# Patient Record
Sex: FEMALE | Race: WHITE | NOT HISPANIC OR LATINO | Employment: FULL TIME | URBAN - METROPOLITAN AREA
[De-identification: names, ages, dates, MRNs, and addresses within clinical notes are randomized per-mention and may not be internally consistent; named-entity substitution may affect disease eponyms.]

---

## 2023-09-26 LAB
AMPHETAMINE (PRESENCE) IN URINE BY SCREEN METHOD: NORMAL
BARBITURATES PRESENCE IN URINE BY SCREEN METHOD: NORMAL
BENZODIAZEPINE (PRESENCE) IN URINE BY SCREEN METHOD: NORMAL
CANNABINOIDS IN URINE BY SCREEN METHOD: NORMAL
COCAINE (PRESENCE) IN URINE BY SCREEN METHOD: NORMAL
DRUG SCREEN COMMENT URINE: NORMAL
FENTANYL URINE: NORMAL
OPIATES (PRESENCE) IN URINE BY SCREEN METHOD: NORMAL
OXYCODONE (PRESENCE) IN URINE BY SCREEN METHOD: NORMAL
PHENCYCLIDINE (PRESENCE) IN URINE BY SCREEN METHOD: NORMAL

## 2023-11-27 PROBLEM — G93.40 ENCEPHALOPATHY: Status: ACTIVE | Noted: 2023-11-27

## 2023-11-27 PROBLEM — F90.2 ATTENTION DEFICIT HYPERACTIVITY DISORDER (ADHD), COMBINED TYPE: Status: ACTIVE | Noted: 2023-11-27

## 2023-11-27 RX ORDER — DEXTROAMPHETAMINE SACCHARATE, AMPHETAMINE ASPARTATE, DEXTROAMPHETAMINE SULFATE AND AMPHETAMINE SULFATE 3.75; 3.75; 3.75; 3.75 MG/1; MG/1; MG/1; MG/1
TABLET ORAL
COMMUNITY
Start: 2020-05-14 | End: 2024-01-16 | Stop reason: SDUPTHER

## 2023-12-19 ENCOUNTER — APPOINTMENT (OUTPATIENT)
Dept: NEUROLOGY | Facility: CLINIC | Age: 48
End: 2023-12-19
Payer: COMMERCIAL

## 2024-01-15 NOTE — PROGRESS NOTES
Subjective   Linda Rankin is a 48 y.o.   female.  HPI  The patient is being seen today for their encephalopathy r/t their ADHD. They are currently taking Adderall 15mg BID and it has been effective. The patient can tell when the medication wears off. The patient agrees that their quality of life has improved while taking this medication. Patient denies any chest pain, heart palpitations, sleep issues, appetite changes, weight loss, and mood changes while taking this medication. Neurological exam is normal. I have reviewed the medications and the chart. Review of systems are negative unless otherwise specified in HPI.    Objective   Neurological Exam  Mental Status  Awake, alert and oriented to person, place and time. Speech is normal. Language is fluent with no aphasia. Attention and concentration are normal.    Cranial Nerves  CN III, IV, VI: Pupils equal round and reactive to light bilaterally.  And EOM's Normal.    Motor   Strength is 5/5 throughout all four extremities.    Sensory  Light touch is normal in upper and lower extremities.     Reflexes  Deep tendon reflexes are 2+ and symmetric in all four extremities.    Gait  Casual gait is normal including stance, stride, and arm swing.Normal toe walking.    Physical Exam  Eyes:      Pupils: Pupils are equal, round, and reactive to light.   Neurological:      Motor: Motor strength is normal.     Deep Tendon Reflexes: Reflexes are normal and symmetric.   Psychiatric:         Speech: Speech normal.     .    Assessment/Plan   Discussed following up in 3 months. Medication was sent to pharmacy. Discussed with the patient the purpose of medication, as well as potential side effects to be aware of. Informed the patient about abuse potential of medication and the importance adhering to the controlled substance agreement. Advised patient to call office with any adverse reaction to medication.

## 2024-01-16 ENCOUNTER — OFFICE VISIT (OUTPATIENT)
Dept: NEUROLOGY | Facility: CLINIC | Age: 49
End: 2024-01-16
Payer: COMMERCIAL

## 2024-01-16 VITALS
HEIGHT: 67 IN | BODY MASS INDEX: 33.78 KG/M2 | SYSTOLIC BLOOD PRESSURE: 158 MMHG | HEART RATE: 94 BPM | DIASTOLIC BLOOD PRESSURE: 89 MMHG | WEIGHT: 215.2 LBS

## 2024-01-16 DIAGNOSIS — G93.40 ENCEPHALOPATHY: ICD-10-CM

## 2024-01-16 DIAGNOSIS — F90.2 ATTENTION DEFICIT HYPERACTIVITY DISORDER, COMBINED TYPE: Primary | ICD-10-CM

## 2024-01-16 PROBLEM — G89.29 CHRONIC RIGHT-SIDED LOW BACK PAIN WITHOUT SCIATICA: Status: ACTIVE | Noted: 2021-05-28

## 2024-01-16 PROBLEM — K76.0 HEPATIC STEATOSIS: Status: ACTIVE | Noted: 2021-07-13

## 2024-01-16 PROBLEM — I10 ESSENTIAL HYPERTENSION: Status: ACTIVE | Noted: 2020-06-10

## 2024-01-16 PROBLEM — M54.50 CHRONIC RIGHT-SIDED LOW BACK PAIN WITHOUT SCIATICA: Status: ACTIVE | Noted: 2021-05-28

## 2024-01-16 PROBLEM — E66.9 OBESITY (BMI 30-39.9): Status: ACTIVE | Noted: 2018-04-17

## 2024-01-16 PROBLEM — F31.9 BIPOLAR AFFECTIVE DISORDER (MULTI): Status: ACTIVE | Noted: 2018-04-17

## 2024-01-16 PROBLEM — Z98.84 S/P LAPAROSCOPIC SLEEVE GASTRECTOMY: Status: ACTIVE | Noted: 2021-09-21

## 2024-01-16 PROBLEM — E66.01 MORBID OBESITY (MULTI): Status: ACTIVE | Noted: 2021-09-17

## 2024-01-16 PROBLEM — E55.9 VITAMIN D DEFICIENCY: Status: ACTIVE | Noted: 2021-07-13

## 2024-01-16 PROBLEM — M54.6 PAIN IN THORACIC SPINE: Status: ACTIVE | Noted: 2019-03-03

## 2024-01-16 PROCEDURE — 99213 OFFICE O/P EST LOW 20 MIN: CPT

## 2024-01-16 PROCEDURE — 3077F SYST BP >= 140 MM HG: CPT

## 2024-01-16 PROCEDURE — 1036F TOBACCO NON-USER: CPT

## 2024-01-16 PROCEDURE — 3079F DIAST BP 80-89 MM HG: CPT

## 2024-01-16 RX ORDER — DEXTROAMPHETAMINE SACCHARATE, AMPHETAMINE ASPARTATE, DEXTROAMPHETAMINE SULFATE AND AMPHETAMINE SULFATE 3.75; 3.75; 3.75; 3.75 MG/1; MG/1; MG/1; MG/1
15 TABLET ORAL
Qty: 60 TABLET | Refills: 0 | Status: SHIPPED | OUTPATIENT
Start: 2024-02-15 | End: 2024-04-10 | Stop reason: SDUPTHER

## 2024-01-16 RX ORDER — FAMOTIDINE 20 MG/1
20 TABLET, FILM COATED ORAL
Status: ON HOLD | COMMUNITY
Start: 2022-01-14 | End: 2024-02-05 | Stop reason: ALTCHOICE

## 2024-01-16 RX ORDER — DEXTROAMPHETAMINE SACCHARATE, AMPHETAMINE ASPARTATE, DEXTROAMPHETAMINE SULFATE AND AMPHETAMINE SULFATE 3.75; 3.75; 3.75; 3.75 MG/1; MG/1; MG/1; MG/1
15 TABLET ORAL
Qty: 60 TABLET | Refills: 0 | Status: SHIPPED | OUTPATIENT
Start: 2024-03-15 | End: 2024-04-10 | Stop reason: SDUPTHER

## 2024-01-16 RX ORDER — DEXTROAMPHETAMINE SACCHARATE, AMPHETAMINE ASPARTATE, DEXTROAMPHETAMINE SULFATE AND AMPHETAMINE SULFATE 3.75; 3.75; 3.75; 3.75 MG/1; MG/1; MG/1; MG/1
15 TABLET ORAL
Qty: 60 TABLET | Refills: 0 | Status: SHIPPED | OUTPATIENT
Start: 2024-01-16 | End: 2024-04-10 | Stop reason: SDUPTHER

## 2024-01-16 RX ORDER — LISINOPRIL 10 MG/1
1 TABLET ORAL
Status: ON HOLD | COMMUNITY
Start: 2021-11-04 | End: 2024-02-05 | Stop reason: ALTCHOICE

## 2024-01-16 ASSESSMENT — PATIENT HEALTH QUESTIONNAIRE - PHQ9
1. LITTLE INTEREST OR PLEASURE IN DOING THINGS: NOT AT ALL
2. FEELING DOWN, DEPRESSED OR HOPELESS: NOT AT ALL
SUM OF ALL RESPONSES TO PHQ9 QUESTIONS 1 & 2: 0

## 2024-01-30 ENCOUNTER — APPOINTMENT (OUTPATIENT)
Dept: NEUROLOGY | Facility: CLINIC | Age: 49
End: 2024-01-30
Payer: COMMERCIAL

## 2024-02-04 ENCOUNTER — HOSPITAL ENCOUNTER (INPATIENT)
Facility: HOSPITAL | Age: 49
LOS: 5 days | Discharge: HOME | DRG: 390 | End: 2024-02-10
Attending: EMERGENCY MEDICINE | Admitting: INTERNAL MEDICINE
Payer: COMMERCIAL

## 2024-02-04 ENCOUNTER — APPOINTMENT (OUTPATIENT)
Dept: RADIOLOGY | Facility: HOSPITAL | Age: 49
DRG: 390 | End: 2024-02-04
Payer: COMMERCIAL

## 2024-02-04 DIAGNOSIS — K56.609 SMALL BOWEL OBSTRUCTION (MULTI): Primary | ICD-10-CM

## 2024-02-04 LAB
ALBUMIN SERPL BCP-MCNC: 4.4 G/DL (ref 3.4–5)
ALP SERPL-CCNC: 56 U/L (ref 33–110)
ALT SERPL W P-5'-P-CCNC: 16 U/L (ref 7–45)
AMYLASE SERPL-CCNC: 33 U/L (ref 29–103)
ANION GAP SERPL CALC-SCNC: 12 MMOL/L (ref 10–20)
AST SERPL W P-5'-P-CCNC: 19 U/L (ref 9–39)
BASOPHILS # BLD AUTO: 0.02 X10*3/UL (ref 0–0.1)
BASOPHILS NFR BLD AUTO: 0.2 %
BILIRUB SERPL-MCNC: 0.7 MG/DL (ref 0–1.2)
BUN SERPL-MCNC: 10 MG/DL (ref 6–23)
CALCIUM SERPL-MCNC: 9.6 MG/DL (ref 8.6–10.3)
CHLORIDE SERPL-SCNC: 99 MMOL/L (ref 98–107)
CO2 SERPL-SCNC: 30 MMOL/L (ref 21–32)
CREAT SERPL-MCNC: 0.63 MG/DL (ref 0.5–1.05)
EGFRCR SERPLBLD CKD-EPI 2021: >90 ML/MIN/1.73M*2
EOSINOPHIL # BLD AUTO: 0.05 X10*3/UL (ref 0–0.7)
EOSINOPHIL NFR BLD AUTO: 0.6 %
ERYTHROCYTE [DISTWIDTH] IN BLOOD BY AUTOMATED COUNT: 11.8 % (ref 11.5–14.5)
GLUCOSE SERPL-MCNC: 104 MG/DL (ref 74–99)
HCT VFR BLD AUTO: 42.4 % (ref 36–46)
HGB BLD-MCNC: 14.9 G/DL (ref 12–16)
IMM GRANULOCYTES # BLD AUTO: 0.02 X10*3/UL (ref 0–0.7)
IMM GRANULOCYTES NFR BLD AUTO: 0.2 % (ref 0–0.9)
LACTATE SERPL-SCNC: 0.7 MMOL/L (ref 0.4–2)
LIPASE SERPL-CCNC: 14 U/L (ref 9–82)
LYMPHOCYTES # BLD AUTO: 2.53 X10*3/UL (ref 1.2–4.8)
LYMPHOCYTES NFR BLD AUTO: 31.3 %
MCH RBC QN AUTO: 30.7 PG (ref 26–34)
MCHC RBC AUTO-ENTMCNC: 35.1 G/DL (ref 32–36)
MCV RBC AUTO: 87 FL (ref 80–100)
MONOCYTES # BLD AUTO: 0.74 X10*3/UL (ref 0.1–1)
MONOCYTES NFR BLD AUTO: 9.1 %
NEUTROPHILS # BLD AUTO: 4.73 X10*3/UL (ref 1.2–7.7)
NEUTROPHILS NFR BLD AUTO: 58.6 %
NRBC BLD-RTO: 0 /100 WBCS (ref 0–0)
PLATELET # BLD AUTO: 283 X10*3/UL (ref 150–450)
POTASSIUM SERPL-SCNC: 4 MMOL/L (ref 3.5–5.3)
PREGNANCY TEST URINE, POC: NEGATIVE
PROT SERPL-MCNC: 7 G/DL (ref 6.4–8.2)
RBC # BLD AUTO: 4.86 X10*6/UL (ref 4–5.2)
SODIUM SERPL-SCNC: 137 MMOL/L (ref 136–145)
WBC # BLD AUTO: 8.1 X10*3/UL (ref 4.4–11.3)

## 2024-02-04 PROCEDURE — 87086 URINE CULTURE/COLONY COUNT: CPT | Mod: ELYLAB | Performed by: EMERGENCY MEDICINE

## 2024-02-04 PROCEDURE — 82150 ASSAY OF AMYLASE: CPT | Performed by: EMERGENCY MEDICINE

## 2024-02-04 PROCEDURE — 36415 COLL VENOUS BLD VENIPUNCTURE: CPT | Performed by: EMERGENCY MEDICINE

## 2024-02-04 PROCEDURE — 74177 CT ABD & PELVIS W/CONTRAST: CPT | Performed by: RADIOLOGY

## 2024-02-04 PROCEDURE — 83690 ASSAY OF LIPASE: CPT | Performed by: EMERGENCY MEDICINE

## 2024-02-04 PROCEDURE — 96376 TX/PRO/DX INJ SAME DRUG ADON: CPT

## 2024-02-04 PROCEDURE — 2550000001 HC RX 255 CONTRASTS: Performed by: EMERGENCY MEDICINE

## 2024-02-04 PROCEDURE — 2500000004 HC RX 250 GENERAL PHARMACY W/ HCPCS (ALT 636 FOR OP/ED): Performed by: EMERGENCY MEDICINE

## 2024-02-04 PROCEDURE — 80053 COMPREHEN METABOLIC PANEL: CPT | Performed by: EMERGENCY MEDICINE

## 2024-02-04 PROCEDURE — 83605 ASSAY OF LACTIC ACID: CPT | Performed by: EMERGENCY MEDICINE

## 2024-02-04 PROCEDURE — 99285 EMERGENCY DEPT VISIT HI MDM: CPT | Mod: 25 | Performed by: EMERGENCY MEDICINE

## 2024-02-04 PROCEDURE — 74177 CT ABD & PELVIS W/CONTRAST: CPT

## 2024-02-04 PROCEDURE — 96374 THER/PROPH/DIAG INJ IV PUSH: CPT

## 2024-02-04 PROCEDURE — 81025 URINE PREGNANCY TEST: CPT | Performed by: EMERGENCY MEDICINE

## 2024-02-04 PROCEDURE — 85025 COMPLETE CBC W/AUTO DIFF WBC: CPT | Performed by: EMERGENCY MEDICINE

## 2024-02-04 PROCEDURE — 96375 TX/PRO/DX INJ NEW DRUG ADDON: CPT

## 2024-02-04 PROCEDURE — 81003 URINALYSIS AUTO W/O SCOPE: CPT | Performed by: EMERGENCY MEDICINE

## 2024-02-04 PROCEDURE — C9113 INJ PANTOPRAZOLE SODIUM, VIA: HCPCS | Performed by: EMERGENCY MEDICINE

## 2024-02-04 RX ORDER — HYDROMORPHONE HYDROCHLORIDE 1 MG/ML
1 INJECTION, SOLUTION INTRAMUSCULAR; INTRAVENOUS; SUBCUTANEOUS ONCE
Status: COMPLETED | OUTPATIENT
Start: 2024-02-04 | End: 2024-02-04

## 2024-02-04 RX ORDER — HYDROMORPHONE HYDROCHLORIDE 1 MG/ML
1 INJECTION, SOLUTION INTRAMUSCULAR; INTRAVENOUS; SUBCUTANEOUS ONCE
Status: DISCONTINUED | OUTPATIENT
Start: 2024-02-04 | End: 2024-02-06

## 2024-02-04 RX ORDER — PANTOPRAZOLE SODIUM 40 MG/10ML
40 INJECTION, POWDER, LYOPHILIZED, FOR SOLUTION INTRAVENOUS ONCE
Status: COMPLETED | OUTPATIENT
Start: 2024-02-04 | End: 2024-02-04

## 2024-02-04 RX ORDER — ONDANSETRON HYDROCHLORIDE 2 MG/ML
4 INJECTION, SOLUTION INTRAVENOUS ONCE
Status: COMPLETED | OUTPATIENT
Start: 2024-02-04 | End: 2024-02-04

## 2024-02-04 RX ADMIN — PANTOPRAZOLE SODIUM 40 MG: 40 INJECTION, POWDER, FOR SOLUTION INTRAVENOUS at 21:30

## 2024-02-04 RX ADMIN — HYDROMORPHONE HYDROCHLORIDE 1 MG: 1 INJECTION, SOLUTION INTRAMUSCULAR; INTRAVENOUS; SUBCUTANEOUS at 22:15

## 2024-02-04 RX ADMIN — SODIUM CHLORIDE 1000 ML: 9 INJECTION, SOLUTION INTRAVENOUS at 21:30

## 2024-02-04 RX ADMIN — ONDANSETRON 4 MG: 2 INJECTION INTRAMUSCULAR; INTRAVENOUS at 21:30

## 2024-02-04 RX ADMIN — IOHEXOL 75 ML: 350 INJECTION, SOLUTION INTRAVENOUS at 22:19

## 2024-02-04 ASSESSMENT — PAIN SCALES - GENERAL
PAINLEVEL_OUTOF10: 8
PAINLEVEL_OUTOF10: 6
PAINLEVEL_OUTOF10: 1
PAINLEVEL_OUTOF10: 6

## 2024-02-04 ASSESSMENT — PAIN - FUNCTIONAL ASSESSMENT
PAIN_FUNCTIONAL_ASSESSMENT: 0-10

## 2024-02-04 ASSESSMENT — PAIN DESCRIPTION - DESCRIPTORS: DESCRIPTORS: ACHING

## 2024-02-04 ASSESSMENT — PAIN DESCRIPTION - PROGRESSION: CLINICAL_PROGRESSION: RAPIDLY IMPROVING

## 2024-02-04 ASSESSMENT — PAIN DESCRIPTION - LOCATION
LOCATION: ABDOMEN
LOCATION: ABDOMEN

## 2024-02-04 ASSESSMENT — PAIN DESCRIPTION - PAIN TYPE
TYPE: ACUTE PAIN
TYPE: ACUTE PAIN

## 2024-02-04 ASSESSMENT — COLUMBIA-SUICIDE SEVERITY RATING SCALE - C-SSRS
6. HAVE YOU EVER DONE ANYTHING, STARTED TO DO ANYTHING, OR PREPARED TO DO ANYTHING TO END YOUR LIFE?: NO
1. IN THE PAST MONTH, HAVE YOU WISHED YOU WERE DEAD OR WISHED YOU COULD GO TO SLEEP AND NOT WAKE UP?: NO
2. HAVE YOU ACTUALLY HAD ANY THOUGHTS OF KILLING YOURSELF?: NO

## 2024-02-04 ASSESSMENT — PAIN DESCRIPTION - ORIENTATION
ORIENTATION: LEFT;RIGHT;LOWER;UPPER
ORIENTATION: LOWER

## 2024-02-04 ASSESSMENT — LIFESTYLE VARIABLES
EVER HAD A DRINK FIRST THING IN THE MORNING TO STEADY YOUR NERVES TO GET RID OF A HANGOVER: NO
HAVE YOU EVER FELT YOU SHOULD CUT DOWN ON YOUR DRINKING: NO
HAVE PEOPLE ANNOYED YOU BY CRITICIZING YOUR DRINKING: NO
EVER FELT BAD OR GUILTY ABOUT YOUR DRINKING: NO

## 2024-02-05 ENCOUNTER — APPOINTMENT (OUTPATIENT)
Dept: RADIOLOGY | Facility: HOSPITAL | Age: 49
DRG: 390 | End: 2024-02-05
Payer: COMMERCIAL

## 2024-02-05 PROBLEM — K56.609 SMALL BOWEL OBSTRUCTION (MULTI): Status: ACTIVE | Noted: 2024-02-05

## 2024-02-05 PROBLEM — K56.7 ILEUS (MULTI): Status: ACTIVE | Noted: 2024-02-05

## 2024-02-05 LAB
ALBUMIN SERPL BCP-MCNC: 3.9 G/DL (ref 3.4–5)
ALP SERPL-CCNC: 50 U/L (ref 33–110)
ALT SERPL W P-5'-P-CCNC: 15 U/L (ref 7–45)
ANION GAP SERPL CALC-SCNC: 11 MMOL/L (ref 10–20)
APPEARANCE UR: CLEAR
AST SERPL W P-5'-P-CCNC: 16 U/L (ref 9–39)
BACTERIA #/AREA URNS AUTO: ABNORMAL /HPF
BASOPHILS # BLD AUTO: 0.01 X10*3/UL (ref 0–0.1)
BASOPHILS NFR BLD AUTO: 0.2 %
BILIRUB SERPL-MCNC: 0.6 MG/DL (ref 0–1.2)
BILIRUB UR STRIP.AUTO-MCNC: NEGATIVE MG/DL
BUN SERPL-MCNC: 8 MG/DL (ref 6–23)
CALCIUM SERPL-MCNC: 8.7 MG/DL (ref 8.6–10.3)
CHLORIDE SERPL-SCNC: 103 MMOL/L (ref 98–107)
CO2 SERPL-SCNC: 27 MMOL/L (ref 21–32)
COLOR UR: YELLOW
CREAT SERPL-MCNC: 0.56 MG/DL (ref 0.5–1.05)
EGFRCR SERPLBLD CKD-EPI 2021: >90 ML/MIN/1.73M*2
EOSINOPHIL # BLD AUTO: 0 X10*3/UL (ref 0–0.7)
EOSINOPHIL NFR BLD AUTO: 0 %
ERYTHROCYTE [DISTWIDTH] IN BLOOD BY AUTOMATED COUNT: 12 % (ref 11.5–14.5)
GLUCOSE SERPL-MCNC: 143 MG/DL (ref 74–99)
GLUCOSE UR STRIP.AUTO-MCNC: NEGATIVE MG/DL
HCT VFR BLD AUTO: 36.5 % (ref 36–46)
HGB BLD-MCNC: 12.9 G/DL (ref 12–16)
HOLD SPECIMEN: NORMAL
HYALINE CASTS #/AREA URNS AUTO: ABNORMAL /LPF
IMM GRANULOCYTES # BLD AUTO: 0.02 X10*3/UL (ref 0–0.7)
IMM GRANULOCYTES NFR BLD AUTO: 0.3 % (ref 0–0.9)
KETONES UR STRIP.AUTO-MCNC: ABNORMAL MG/DL
LEUKOCYTE ESTERASE UR QL STRIP.AUTO: ABNORMAL
LYMPHOCYTES # BLD AUTO: 0.71 X10*3/UL (ref 1.2–4.8)
LYMPHOCYTES NFR BLD AUTO: 12.1 %
MAGNESIUM SERPL-MCNC: 1.55 MG/DL (ref 1.6–2.4)
MCH RBC QN AUTO: 30.9 PG (ref 26–34)
MCHC RBC AUTO-ENTMCNC: 35.3 G/DL (ref 32–36)
MCV RBC AUTO: 88 FL (ref 80–100)
MONOCYTES # BLD AUTO: 0.29 X10*3/UL (ref 0.1–1)
MONOCYTES NFR BLD AUTO: 5 %
MUCOUS THREADS #/AREA URNS AUTO: ABNORMAL /LPF
NEUTROPHILS # BLD AUTO: 4.82 X10*3/UL (ref 1.2–7.7)
NEUTROPHILS NFR BLD AUTO: 82.4 %
NITRITE UR QL STRIP.AUTO: NEGATIVE
NRBC BLD-RTO: 0 /100 WBCS (ref 0–0)
PH UR STRIP.AUTO: 6 [PH]
PLATELET # BLD AUTO: 203 X10*3/UL (ref 150–450)
POTASSIUM SERPL-SCNC: 4 MMOL/L (ref 3.5–5.3)
PROT SERPL-MCNC: 6.2 G/DL (ref 6.4–8.2)
PROT UR STRIP.AUTO-MCNC: NEGATIVE MG/DL
RBC # BLD AUTO: 4.17 X10*6/UL (ref 4–5.2)
RBC # UR STRIP.AUTO: ABNORMAL /UL
RBC #/AREA URNS AUTO: ABNORMAL /HPF
SODIUM SERPL-SCNC: 137 MMOL/L (ref 136–145)
SP GR UR STRIP.AUTO: 1.01
SQUAMOUS #/AREA URNS AUTO: ABNORMAL /HPF
UROBILINOGEN UR STRIP.AUTO-MCNC: <2 MG/DL
WBC # BLD AUTO: 5.9 X10*3/UL (ref 4.4–11.3)
WBC #/AREA URNS AUTO: ABNORMAL /HPF

## 2024-02-05 PROCEDURE — 83735 ASSAY OF MAGNESIUM: CPT | Performed by: NURSE PRACTITIONER

## 2024-02-05 PROCEDURE — 2500000004 HC RX 250 GENERAL PHARMACY W/ HCPCS (ALT 636 FOR OP/ED): Performed by: EMERGENCY MEDICINE

## 2024-02-05 PROCEDURE — 80053 COMPREHEN METABOLIC PANEL: CPT | Performed by: NURSE PRACTITIONER

## 2024-02-05 PROCEDURE — 96361 HYDRATE IV INFUSION ADD-ON: CPT

## 2024-02-05 PROCEDURE — 74018 RADEX ABDOMEN 1 VIEW: CPT | Performed by: RADIOLOGY

## 2024-02-05 PROCEDURE — 85025 COMPLETE CBC W/AUTO DIFF WBC: CPT | Performed by: NURSE PRACTITIONER

## 2024-02-05 PROCEDURE — 36415 COLL VENOUS BLD VENIPUNCTURE: CPT | Performed by: NURSE PRACTITIONER

## 2024-02-05 PROCEDURE — 99222 1ST HOSP IP/OBS MODERATE 55: CPT | Performed by: NURSE PRACTITIONER

## 2024-02-05 PROCEDURE — 99221 1ST HOSP IP/OBS SF/LOW 40: CPT | Performed by: SURGERY

## 2024-02-05 PROCEDURE — 99222 1ST HOSP IP/OBS MODERATE 55: CPT

## 2024-02-05 PROCEDURE — 1200000002 HC GENERAL ROOM WITH TELEMETRY DAILY

## 2024-02-05 PROCEDURE — 74018 RADEX ABDOMEN 1 VIEW: CPT

## 2024-02-05 PROCEDURE — 0D9670Z DRAINAGE OF STOMACH WITH DRAINAGE DEVICE, VIA NATURAL OR ARTIFICIAL OPENING: ICD-10-PCS | Performed by: EMERGENCY MEDICINE

## 2024-02-05 PROCEDURE — 2500000004 HC RX 250 GENERAL PHARMACY W/ HCPCS (ALT 636 FOR OP/ED): Performed by: STUDENT IN AN ORGANIZED HEALTH CARE EDUCATION/TRAINING PROGRAM

## 2024-02-05 PROCEDURE — 2500000004 HC RX 250 GENERAL PHARMACY W/ HCPCS (ALT 636 FOR OP/ED): Performed by: NURSE PRACTITIONER

## 2024-02-05 PROCEDURE — C9113 INJ PANTOPRAZOLE SODIUM, VIA: HCPCS | Performed by: NURSE PRACTITIONER

## 2024-02-05 RX ORDER — HYDROMORPHONE HYDROCHLORIDE 1 MG/ML
1 INJECTION, SOLUTION INTRAMUSCULAR; INTRAVENOUS; SUBCUTANEOUS ONCE
Status: COMPLETED | OUTPATIENT
Start: 2024-02-05 | End: 2024-02-05

## 2024-02-05 RX ORDER — SODIUM CHLORIDE 9 MG/ML
100 INJECTION, SOLUTION INTRAVENOUS CONTINUOUS
Status: DISCONTINUED | OUTPATIENT
Start: 2024-02-05 | End: 2024-02-09

## 2024-02-05 RX ORDER — ACETAMINOPHEN 325 MG/1
650 TABLET ORAL EVERY 4 HOURS PRN
Status: DISCONTINUED | OUTPATIENT
Start: 2024-02-05 | End: 2024-02-10 | Stop reason: HOSPADM

## 2024-02-05 RX ORDER — PANTOPRAZOLE SODIUM 40 MG/1
40 TABLET, DELAYED RELEASE ORAL DAILY
Status: DISCONTINUED | OUTPATIENT
Start: 2024-02-05 | End: 2024-02-10 | Stop reason: HOSPADM

## 2024-02-05 RX ORDER — ONDANSETRON 4 MG/1
4 TABLET, FILM COATED ORAL EVERY 8 HOURS PRN
Status: DISCONTINUED | OUTPATIENT
Start: 2024-02-05 | End: 2024-02-10 | Stop reason: HOSPADM

## 2024-02-05 RX ORDER — KETOROLAC TROMETHAMINE 30 MG/ML
30 INJECTION, SOLUTION INTRAMUSCULAR; INTRAVENOUS EVERY 6 HOURS PRN
Status: DISPENSED | OUTPATIENT
Start: 2024-02-05 | End: 2024-02-10

## 2024-02-05 RX ORDER — PANTOPRAZOLE SODIUM 40 MG/10ML
40 INJECTION, POWDER, LYOPHILIZED, FOR SOLUTION INTRAVENOUS DAILY
Status: DISCONTINUED | OUTPATIENT
Start: 2024-02-05 | End: 2024-02-10 | Stop reason: HOSPADM

## 2024-02-05 RX ORDER — MORPHINE SULFATE 2 MG/ML
2 INJECTION, SOLUTION INTRAMUSCULAR; INTRAVENOUS EVERY 6 HOURS PRN
Status: DISCONTINUED | OUTPATIENT
Start: 2024-02-05 | End: 2024-02-10 | Stop reason: HOSPADM

## 2024-02-05 RX ORDER — ACETAMINOPHEN 650 MG/1
650 SUPPOSITORY RECTAL EVERY 4 HOURS PRN
Status: DISCONTINUED | OUTPATIENT
Start: 2024-02-05 | End: 2024-02-10 | Stop reason: HOSPADM

## 2024-02-05 RX ORDER — ONDANSETRON HYDROCHLORIDE 2 MG/ML
4 INJECTION, SOLUTION INTRAVENOUS EVERY 8 HOURS PRN
Status: DISCONTINUED | OUTPATIENT
Start: 2024-02-05 | End: 2024-02-10 | Stop reason: HOSPADM

## 2024-02-05 RX ORDER — MAGNESIUM SULFATE HEPTAHYDRATE 40 MG/ML
4 INJECTION, SOLUTION INTRAVENOUS ONCE
Status: COMPLETED | OUTPATIENT
Start: 2024-02-05 | End: 2024-02-05

## 2024-02-05 RX ORDER — ACETAMINOPHEN 160 MG/5ML
650 SOLUTION ORAL EVERY 4 HOURS PRN
Status: DISCONTINUED | OUTPATIENT
Start: 2024-02-05 | End: 2024-02-10 | Stop reason: HOSPADM

## 2024-02-05 RX ADMIN — SODIUM CHLORIDE 100 ML/HR: 9 INJECTION, SOLUTION INTRAVENOUS at 04:36

## 2024-02-05 RX ADMIN — SODIUM CHLORIDE 100 ML/HR: 9 INJECTION, SOLUTION INTRAVENOUS at 20:03

## 2024-02-05 RX ADMIN — SODIUM CHLORIDE 100 ML/HR: 9 INJECTION, SOLUTION INTRAVENOUS at 01:40

## 2024-02-05 RX ADMIN — PANTOPRAZOLE SODIUM 40 MG: 40 INJECTION, POWDER, FOR SOLUTION INTRAVENOUS at 08:09

## 2024-02-05 RX ADMIN — MORPHINE SULFATE 2 MG: 2 INJECTION, SOLUTION INTRAMUSCULAR; INTRAVENOUS at 13:16

## 2024-02-05 RX ADMIN — HYDROMORPHONE HYDROCHLORIDE 1 MG: 1 INJECTION, SOLUTION INTRAMUSCULAR; INTRAVENOUS; SUBCUTANEOUS at 01:37

## 2024-02-05 RX ADMIN — MAGNESIUM SULFATE HEPTAHYDRATE 4 G: 40 INJECTION, SOLUTION INTRAVENOUS at 08:10

## 2024-02-05 RX ADMIN — KETOROLAC TROMETHAMINE 30 MG: 30 INJECTION, SOLUTION INTRAMUSCULAR; INTRAVENOUS at 09:16

## 2024-02-05 RX ADMIN — KETOROLAC TROMETHAMINE 30 MG: 30 INJECTION, SOLUTION INTRAMUSCULAR; INTRAVENOUS at 16:03

## 2024-02-05 RX ADMIN — HYDROMORPHONE HYDROCHLORIDE 1 MG: 1 INJECTION, SOLUTION INTRAMUSCULAR; INTRAVENOUS; SUBCUTANEOUS at 04:38

## 2024-02-05 SDOH — SOCIAL STABILITY: SOCIAL INSECURITY: WERE YOU ABLE TO COMPLETE ALL THE BEHAVIORAL HEALTH SCREENINGS?: YES

## 2024-02-05 SDOH — SOCIAL STABILITY: SOCIAL INSECURITY: ARE THERE ANY APPARENT SIGNS OF INJURIES/BEHAVIORS THAT COULD BE RELATED TO ABUSE/NEGLECT?: NO

## 2024-02-05 SDOH — SOCIAL STABILITY: SOCIAL INSECURITY: DO YOU FEEL UNSAFE GOING BACK TO THE PLACE WHERE YOU ARE LIVING?: NO

## 2024-02-05 SDOH — SOCIAL STABILITY: SOCIAL INSECURITY: ABUSE: ADULT

## 2024-02-05 SDOH — SOCIAL STABILITY: SOCIAL INSECURITY: DO YOU FEEL ANYONE HAS EXPLOITED OR TAKEN ADVANTAGE OF YOU FINANCIALLY OR OF YOUR PERSONAL PROPERTY?: NO

## 2024-02-05 SDOH — SOCIAL STABILITY: SOCIAL INSECURITY: DOES ANYONE TRY TO KEEP YOU FROM HAVING/CONTACTING OTHER FRIENDS OR DOING THINGS OUTSIDE YOUR HOME?: NO

## 2024-02-05 SDOH — SOCIAL STABILITY: SOCIAL INSECURITY: HAVE YOU HAD THOUGHTS OF HARMING ANYONE ELSE?: NO

## 2024-02-05 SDOH — SOCIAL STABILITY: SOCIAL INSECURITY: ARE YOU OR HAVE YOU BEEN THREATENED OR ABUSED PHYSICALLY, EMOTIONALLY, OR SEXUALLY BY ANYONE?: NO

## 2024-02-05 SDOH — SOCIAL STABILITY: SOCIAL INSECURITY: HAS ANYONE EVER THREATENED TO HURT YOUR FAMILY OR YOUR PETS?: NO

## 2024-02-05 ASSESSMENT — PAIN SCALES - GENERAL
PAINLEVEL_OUTOF10: 5 - MODERATE PAIN
PAINLEVEL_OUTOF10: 6
PAINLEVEL_OUTOF10: 2
PAINLEVEL_OUTOF10: 8
PAINLEVEL_OUTOF10: 6
PAINLEVEL_OUTOF10: 1
PAINLEVEL_OUTOF10: 7
PAINLEVEL_OUTOF10: 6
PAINLEVEL_OUTOF10: 10 - WORST POSSIBLE PAIN

## 2024-02-05 ASSESSMENT — COGNITIVE AND FUNCTIONAL STATUS - GENERAL
MOBILITY SCORE: 24
DAILY ACTIVITIY SCORE: 24
MOBILITY SCORE: 24
PATIENT BASELINE BEDBOUND: NO
MOBILITY SCORE: 24
DAILY ACTIVITIY SCORE: 24
DAILY ACTIVITIY SCORE: 24

## 2024-02-05 ASSESSMENT — ACTIVITIES OF DAILY LIVING (ADL)
FEEDING YOURSELF: INDEPENDENT
BATHING: INDEPENDENT
DRESSING YOURSELF: INDEPENDENT
WALKS IN HOME: INDEPENDENT
GROOMING: INDEPENDENT
ADEQUATE_TO_COMPLETE_ADL: YES
TOILETING: INDEPENDENT
PATIENT'S MEMORY ADEQUATE TO SAFELY COMPLETE DAILY ACTIVITIES?: YES
LACK_OF_TRANSPORTATION: NO
HEARING - RIGHT EAR: FUNCTIONAL
HEARING - LEFT EAR: FUNCTIONAL
JUDGMENT_ADEQUATE_SAFELY_COMPLETE_DAILY_ACTIVITIES: YES

## 2024-02-05 ASSESSMENT — PAIN - FUNCTIONAL ASSESSMENT
PAIN_FUNCTIONAL_ASSESSMENT: 0-10

## 2024-02-05 ASSESSMENT — PAIN DESCRIPTION - DESCRIPTORS
DESCRIPTORS: TENDER

## 2024-02-05 ASSESSMENT — ENCOUNTER SYMPTOMS
DIZZINESS: 0
NAUSEA: 1
ABDOMINAL PAIN: 1
BACK PAIN: 1
VOMITING: 1
SHORTNESS OF BREATH: 0
TROUBLE SWALLOWING: 0
ABDOMINAL DISTENTION: 1
CONSTIPATION: 1
MYALGIAS: 0
DYSURIA: 0
FEVER: 0

## 2024-02-05 ASSESSMENT — LIFESTYLE VARIABLES
HOW OFTEN DO YOU HAVE 6 OR MORE DRINKS ON ONE OCCASION: NEVER
AUDIT-C TOTAL SCORE: 0
HOW OFTEN DO YOU HAVE A DRINK CONTAINING ALCOHOL: NEVER
SKIP TO QUESTIONS 9-10: 1
AUDIT-C TOTAL SCORE: 0
HOW MANY STANDARD DRINKS CONTAINING ALCOHOL DO YOU HAVE ON A TYPICAL DAY: PATIENT DOES NOT DRINK

## 2024-02-05 ASSESSMENT — PAIN DESCRIPTION - LOCATION
LOCATION: ABDOMEN

## 2024-02-05 ASSESSMENT — PATIENT HEALTH QUESTIONNAIRE - PHQ9
SUM OF ALL RESPONSES TO PHQ9 QUESTIONS 1 & 2: 0
1. LITTLE INTEREST OR PLEASURE IN DOING THINGS: NOT AT ALL
2. FEELING DOWN, DEPRESSED OR HOPELESS: NOT AT ALL

## 2024-02-05 ASSESSMENT — PAIN DESCRIPTION - ORIENTATION
ORIENTATION: LOWER

## 2024-02-05 NOTE — H&P
Medical Group History and Physical    ASSESSMENT & PLAN:     #Small bowel obstruction v ileus  Consult GI surgery - will see in AM  NG tube to low intermittent suction  NPO with ice chips  Limit use of narcotics   IV fluids  Pain control  Nausea control  DVT prophylaxis     VTE Prophylaxis: lovenox    ZANDRA Rich    HISTORY OF PRESENT ILLNESS:   Chief Complaint: abdominal pain    History Of Present Illness:    Linda Rankin is a 48 y.o. female with a significant past medical history of gastrectomy, cholecystectomy, hiatal hernia repair with gastric sleeve, morbid obesity, HTN, and BiPolar presenting to [facility] with c/o generalized abdominal pain that began about 3 days ago, endorses nausea with vomiting and abdominal bloating/distention.      Lab work is unremarkable, no leukocytosis, renal function near baseline, normal lactate. UA negative for UTI.    CT scan reveals multiple dilated loops of small bowel with gradual transitional narrowing; suspicious for enteritis with ileus or partial obstruction; GI surgery consulted and will see pt in the morning.     VSS and patient is ready for admission under general medicine service for the management of enteritis with partial SBO v ileus.    Review of systems: 10 point review of systems is otherwise negative except as mentioned above.    PAST HISTORIES:       Past Medical History:  Medical Problems       Problem List       * (Principal) Ileus (CMS/[facility])    Overview Signed 2/5/2024  1:21 AM by ZANDRA Rich     GI Surgery will follow in AM for partial SBO v ileus         Attention deficit hyperactivity disorder, combined type    Overview Signed 1/16/2024  1:00 PM by Siobhan Quiroz MA     Formatting of this note might be different from the original. Last Assessment & Plan: Formatting of this note might be different from the original. Assessment: stable on daily Adderall Last Assessment & Plan: Formatting of this note might be different  from the original. Assessment: stable on daily Adderall         Encephalopathy    Arthritis    Bipolar affective disorder (CMS/HCC)    Chronic right-sided low back pain without sciatica    Essential hypertension    Overview Signed 1/16/2024  1:00 PM by Siobhan Quiroz MA     Formatting of this note might be different from the original. Last Assessment & Plan: Formatting of this note might be different from the original. Assessment: stable on lisinopril Last Assessment & Plan: Formatting of this note might be different from the original. Assessment: stable on lisinopril         Hepatic steatosis    Morbid obesity (CMS/HCC)    Obesity (BMI 30-39.9)    Overview Signed 1/16/2024  1:00 PM by Siobhan Quiroz MA     Last Assessment & Plan: Formatting of this note might be different from the original. Assessment: BMI 38.25         Pain in thoracic spine    S/P laparoscopic sleeve gastrectomy    Overview Signed 1/16/2024  1:00 PM by Siobhan Quiroz MA     Formatting of this note might be different from the original. Formatting of this note might be different from the original. 9/20/2021 With Dr. Bustillo at [facility] Formatting of this note might be different from the original. 9/20/2021 With Dr. Bustillo at [facility]         Vitamin D deficiency           Past Surgical History:  Past Surgical History:   Procedure Laterality Date    OTHER SURGICAL HISTORY  11/26/2018    Gallbladder surgery          Social History:  She reports that she has never smoked. She has never used smokeless tobacco. No history on file for alcohol use and drug use.    Family History:  No family history on file.     Allergies:  Amlodipine    OBJECTIVE:       Last Recorded Vitals:  Vitals:    02/04/24 2058 02/04/24 2114 02/04/24 2158 02/04/24 2258   BP: 127/90  (!) 136/96 134/77   BP Location: Right arm  Right arm Right arm   Patient Position: Lying  Lying Lying   Pulse: 89  85 90   Resp: 18  18 18   Temp:       TempSrc:        SpO2: 99% 99% 99% 98%   Weight:       Height:           Last I/O:  No intake/output data recorded.    Physical Exam  Vitals and nursing note reviewed.   Constitutional:       Appearance: Normal appearance.   HENT:      Head: Normocephalic and atraumatic.      Nose: Nose normal.      Mouth/Throat:      Mouth: Mucous membranes are moist.      Pharynx: Oropharynx is clear.   Eyes:      Extraocular Movements: Extraocular movements intact.      Conjunctiva/sclera: Conjunctivae normal.      Pupils: Pupils are equal, round, and reactive to light.   Cardiovascular:      Rate and Rhythm: Normal rate and regular rhythm.      Pulses: Normal pulses.      Heart sounds: Normal heart sounds.   Pulmonary:      Effort: Pulmonary effort is normal.      Breath sounds: Normal breath sounds.   Abdominal:      General: Abdomen is flat. Bowel sounds are normal.      Palpations: Abdomen is soft.      Tenderness: There is abdominal tenderness.   Musculoskeletal:         General: Normal range of motion.      Cervical back: Normal range of motion and neck supple.   Skin:     General: Skin is warm and dry.      Capillary Refill: Capillary refill takes less than 2 seconds.   Neurological:      General: No focal deficit present.      Mental Status: She is alert and oriented to person, place, and time. Mental status is at baseline.   Psychiatric:         Mood and Affect: Mood normal.         Behavior: Behavior normal.         Thought Content: Thought content normal.         Judgment: Judgment normal.           Scheduled Medications  HYDROmorphone, 1 mg, intravenous, Once      PRN Medications    Continuous Medications       Outpatient Medications:  Prior to Admission medications    Medication Sig Start Date End Date Taking? Authorizing Provider   amphetamine-dextroamphetamine (Adderall) 15 mg tablet Take 1 tablet (15 mg) by mouth 2 times daily after breakfast and lunch. 1/16/24   SARMAD Fitzgerald-CNP   amphetamine-dextroamphetamine (Adderall)  15 mg tablet Take 1 tablet (15 mg) by mouth 2 times daily after breakfast and lunch. Do not start before February 15, 2024. 2/15/24 3/16/24  ZANDRA Fitzgerald   amphetamine-dextroamphetamine (Adderall) 15 mg tablet Take 1 tablet (15 mg) by mouth 2 times daily after breakfast and lunch. Do not start before March 15, 2024. 3/15/24 4/14/24  ZANDRA Fitzgerald   famotidine (Pepcid) 20 mg tablet Take 1 tablet (20 mg) by mouth once daily. 1/14/22   Historical Provider, MD   lisinopril 10 mg tablet Take 1 tablet (10 mg) by mouth once daily. 11/4/21   Historical Provider, MD       LABS AND IMAGING:     Labs:  Results for orders placed or performed during the hospital encounter of 02/04/24 (from the past 24 hour(s))   CBC and Auto Differential   Result Value Ref Range    WBC 8.1 4.4 - 11.3 x10*3/uL    nRBC 0.0 0.0 - 0.0 /100 WBCs    RBC 4.86 4.00 - 5.20 x10*6/uL    Hemoglobin 14.9 12.0 - 16.0 g/dL    Hematocrit 42.4 36.0 - 46.0 %    MCV 87 80 - 100 fL    MCH 30.7 26.0 - 34.0 pg    MCHC 35.1 32.0 - 36.0 g/dL    RDW 11.8 11.5 - 14.5 %    Platelets 283 150 - 450 x10*3/uL    Neutrophils % 58.6 40.0 - 80.0 %    Immature Granulocytes %, Automated 0.2 0.0 - 0.9 %    Lymphocytes % 31.3 13.0 - 44.0 %    Monocytes % 9.1 2.0 - 10.0 %    Eosinophils % 0.6 0.0 - 6.0 %    Basophils % 0.2 0.0 - 2.0 %    Neutrophils Absolute 4.73 1.20 - 7.70 x10*3/uL    Immature Granulocytes Absolute, Automated 0.02 0.00 - 0.70 x10*3/uL    Lymphocytes Absolute 2.53 1.20 - 4.80 x10*3/uL    Monocytes Absolute 0.74 0.10 - 1.00 x10*3/uL    Eosinophils Absolute 0.05 0.00 - 0.70 x10*3/uL    Basophils Absolute 0.02 0.00 - 0.10 x10*3/uL   Comprehensive metabolic panel   Result Value Ref Range    Glucose 104 (H) 74 - 99 mg/dL    Sodium 137 136 - 145 mmol/L    Potassium 4.0 3.5 - 5.3 mmol/L    Chloride 99 98 - 107 mmol/L    Bicarbonate 30 21 - 32 mmol/L    Anion Gap 12 10 - 20 mmol/L    Urea Nitrogen 10 6 - 23 mg/dL    Creatinine 0.63 0.50 - 1.05 mg/dL     eGFR >90 >60 mL/min/1.73m*2    Calcium 9.6 8.6 - 10.3 mg/dL    Albumin 4.4 3.4 - 5.0 g/dL    Alkaline Phosphatase 56 33 - 110 U/L    Total Protein 7.0 6.4 - 8.2 g/dL    AST 19 9 - 39 U/L    Bilirubin, Total 0.7 0.0 - 1.2 mg/dL    ALT 16 7 - 45 U/L   Lipase   Result Value Ref Range    Lipase 14 9 - 82 U/L   Lactate   Result Value Ref Range    Lactate 0.7 0.4 - 2.0 mmol/L   Amylase   Result Value Ref Range    Amylase 33 29 - 103 U/L   POCT pregnancy, urine   Result Value Ref Range    Preg Test, Ur Negative Negative   Urinalysis with Reflex Culture and Microscopic   Result Value Ref Range    Color, Urine Yellow Straw, Yellow    Appearance, Urine Clear Clear    Specific Gravity, Urine 1.010 1.005 - 1.035    pH, Urine 6.0 5.0, 5.5, 6.0, 6.5, 7.0, 7.5, 8.0    Protein, Urine NEGATIVE NEGATIVE mg/dL    Glucose, Urine NEGATIVE NEGATIVE mg/dL    Blood, Urine MODERATE (2+) (A) NEGATIVE    Ketones, Urine 5 (TRACE) (A) NEGATIVE mg/dL    Bilirubin, Urine NEGATIVE NEGATIVE    Urobilinogen, Urine <2.0 <2.0 mg/dL    Nitrite, Urine NEGATIVE NEGATIVE    Leukocyte Esterase, Urine TRACE (A) NEGATIVE   Microscopic Only, Urine   Result Value Ref Range    WBC, Urine 1-5 1-5, NONE /HPF    RBC, Urine 6-10 (A) NONE, 1-2, 3-5 /HPF    Squamous Epithelial Cells, Urine 1-9 (SPARSE) Reference range not established. /HPF    Bacteria, Urine 1+ (A) NONE SEEN /HPF    Mucus, Urine 1+ Reference range not established. /LPF    Hyaline Casts, Urine OCCASIONAL (A) NONE /LPF        Imaging:  CT abdomen pelvis w IV contrast   Final Result   Multiple dilated loops of small bowel with gradual transitional   narrowing associated with mild the thickened bowel loops. Findings   are suspicious for enteritis with ileus or partial obstruction.        Mild ascites.        Additional chronic findings as above.                  MACRO:   None        Signed by: Lis Valdivia 2/5/2024 12:39 AM   Dictation workstation:   [id number]

## 2024-02-05 NOTE — CONSULTS
"Inpatient consult to Acute Care Surgery  Consult performed by: Julita Waters PA-C  Consult ordered by: Miguel Huddleston MD  Reason for consult: SBO vs ileus      General Surgery Consult Note  Patient: Linda Rankin  Unit/Bed: 620/620-A  YOB: 1975  MRN: [id number]  Acct: [id number]   Admitting Diagnosis: Ileus (CMS/HCC) [K56.7]  Small bowel obstruction (CMS/HCC) [K56.609]  Date:  2/4/2024  Hospital Day: 0  Attending: Miguel Huddleston MD    Complaint:  Chief Complaint   Patient presents with    Abdominal Pain     Lower since Friday.  Says when she applies ice packs  it has helped      History of Present Illness:  \"Linad Rankin is a 48 y.o. female with a significant past medical history of gastrectomy, cholecystectomy, hiatal hernia repair with gastric sleeve, morbid obesity, HTN, and BiPolar presenting to [facility] with c/o generalized abdominal pain that began about 3 days ago, endorses nausea with vomiting and abdominal bloating/distention.\" She has constant pain to her lower abdomen and intermittent cramping to her entire abdomen. Pain is worse with movement. Last BM was 3 days ago. She denies recent illness or bad foods. She has also had some bright red spotting on the toilet paper when she wipes but is unsure if she started her menstrual period, LMP 1 month ago.     Lab work is unremarkable, no leukocytosis, normal lactate. UA negative for UTI.     CT scan reveals multiple dilated loops of small bowel with gradual transitional narrowing; suspicious for enteritis with ileus or partial obstruction; General Surgery was consulted for SBO vs ileus.  Allergies:  Allergies   Allergen Reactions    Amlodipine Unknown     Other reaction(s): Other: See Comments   edema    edema      PMHx:  History reviewed. No pertinent past medical history.  PSHx:  Past Surgical History:   Procedure Laterality Date    OTHER SURGICAL HISTORY  11/26/2018    Gallbladder surgery    TONSILLECTOMY      TUBAL LIGATION   "     Social Hx:  Social History     Socioeconomic History    Marital status: Single     Spouse name: None    Number of children: None    Years of education: None    Highest education level: None   Occupational History    None   Tobacco Use    Smoking status: Never    Smokeless tobacco: Never   Substance and Sexual Activity    Alcohol use: None    Drug use: None    Sexual activity: None   Other Topics Concern    None   Social History Narrative    None     Social Determinants of Health     Financial Resource Strain: Low Risk  (2/5/2024)    Overall Financial Resource Strain (CARDIA)     Difficulty of Paying Living Expenses: Not hard at all   Food Insecurity: Not on file   Transportation Needs: No Transportation Needs (2/5/2024)    PRAPARE - Transportation     Lack of Transportation (Medical): No     Lack of Transportation (Non-Medical): No   Physical Activity: Not on file   Stress: Not on file   Social Connections: Not on file   Intimate Partner Violence: Not on file   Housing Stability: Low Risk  (2/5/2024)    Housing Stability Vital Sign     Unable to Pay for Housing in the Last Year: No     Number of Places Lived in the Last Year: 1     Unstable Housing in the Last Year: No     Family Hx:  None relevant  Review of Systems:   Review of Systems   Constitutional:  Negative for fever.   HENT:  Negative for trouble swallowing.    Eyes:  Negative for visual disturbance.   Respiratory:  Negative for shortness of breath.    Cardiovascular:  Negative for chest pain and leg swelling.   Gastrointestinal:  Positive for abdominal distention, abdominal pain, constipation, nausea and vomiting.   Genitourinary:  Negative for dysuria.   Musculoskeletal:  Positive for back pain. Negative for myalgias.   Skin:  Negative for rash.   Neurological:  Negative for dizziness.     Physical Examination:    Visit Vitals  /81 (BP Location: Right arm, Patient Position: Lying)   Pulse 95   Temp 36.9 °C (98.4 °F) (Temporal)   Resp 16       Physical Exam  Constitutional:       General: She is not in acute distress.     Comments: Appears uncomfortable.   HENT:      Head: Normocephalic and atraumatic.      Nose:      Comments: NG     Mouth/Throat:      Mouth: Mucous membranes are moist.   Eyes:      Conjunctiva/sclera: Conjunctivae normal.   Pulmonary:      Effort: Pulmonary effort is normal. No respiratory distress.   Abdominal:      General: Abdomen is flat. Bowel sounds are decreased. There is no distension.      Palpations: Abdomen is soft.      Tenderness: There is abdominal tenderness (generalized, worse in upper abdomen). There is no guarding.   Musculoskeletal:         General: No swelling.   Skin:     General: Skin is warm and dry.      Capillary Refill: Capillary refill takes less than 2 seconds.   Neurological:      Mental Status: She is alert.   Psychiatric:         Mood and Affect: Mood normal.         Behavior: Behavior normal.       LABS:  CBC:   Lab Results   Component Value Date    WBC 5.9 02/05/2024    RBC 4.17 02/05/2024    HGB 12.9 02/05/2024    HCT 36.5 02/05/2024    MCV 88 02/05/2024    MCH 30.9 02/05/2024    MCHC 35.3 02/05/2024    RDW 12.0 02/05/2024     02/05/2024     CBC with Differential:    Lab Results   Component Value Date    WBC 5.9 02/05/2024    RBC 4.17 02/05/2024    HGB 12.9 02/05/2024    HCT 36.5 02/05/2024     02/05/2024    MCV 88 02/05/2024    MCH 30.9 02/05/2024    MCHC 35.3 02/05/2024    RDW 12.0 02/05/2024    NRBC 0.0 02/05/2024    LYMPHOPCT 12.1 02/05/2024    MONOPCT 5.0 02/05/2024    EOSPCT 0.0 02/05/2024    BASOPCT 0.2 02/05/2024    MONOSABS 0.29 02/05/2024    LYMPHSABS 0.71 (L) 02/05/2024    EOSABS 0.00 02/05/2024    BASOSABS 0.01 02/05/2024     CMP:    Lab Results   Component Value Date     02/05/2024    K 4.0 02/05/2024     02/05/2024    CO2 27 02/05/2024    BUN 8 02/05/2024    CREATININE 0.56 02/05/2024    GLUCOSE 143 (H) 02/05/2024    PROT 6.2 (L) 02/05/2024    CALCIUM 8.7  "02/05/2024    BILITOT 0.6 02/05/2024    ALKPHOS 50 02/05/2024    AST 16 02/05/2024    ALT 15 02/05/2024     BMP:    Lab Results   Component Value Date     02/05/2024    K 4.0 02/05/2024     02/05/2024    CO2 27 02/05/2024    BUN 8 02/05/2024    CREATININE 0.56 02/05/2024    CALCIUM 8.7 02/05/2024    GLUCOSE 143 (H) 02/05/2024     Magnesium:  Lab Results   Component Value Date    MG 1.55 (L) 02/05/2024     Troponin:  No results found for: \"TROPHS\"  Lipid Panel:  No results found for: \"HDL\", \"CHHDL\", \"VLDL\", \"TRIG\", \"NHDL\"   Current Medications:    Current Facility-Administered Medications:     acetaminophen (Tylenol) tablet 650 mg, 650 mg, oral, q4h PRN **OR** acetaminophen (Tylenol) oral liquid 650 mg, 650 mg, oral, q4h PRN **OR** acetaminophen (Tylenol) suppository 650 mg, 650 mg, rectal, q4h PRN, ZANDRA Rich    HYDROmorphone (Dilaudid) injection 1 mg, 1 mg, intravenous, Once, ZANDRA Rich    ketorolac (Toradol) injection 30 mg, 30 mg, intravenous, q6h PRN, Miguel Huddleston MD, 30 mg at 02/05/24 0916    morphine injection 2 mg, 2 mg, intravenous, q6h PRN, Miguel Huddleston MD, 2 mg at 02/05/24 1316    ondansetron (Zofran) tablet 4 mg, 4 mg, oral, q8h PRN **OR** ondansetron (Zofran) injection 4 mg, 4 mg, intravenous, q8h PRN, ZANDRA Rich    pantoprazole (ProtoNix) EC tablet 40 mg, 40 mg, oral, Daily **OR** pantoprazole (ProtoNix) injection 40 mg, 40 mg, intravenous, Daily, ZANDRA Rich, 40 mg at 02/05/24 0809    sodium chloride 0.9% infusion, 100 mL/hr, intravenous, Continuous, ZANDRA Rich, Last Rate: 100 mL/hr at 02/05/24 1210, 100 mL/hr at 02/05/24 1210  XR abdomen 1 view    Result Date: 2/5/2024  Interpreted By:  Beth Erwin, STUDY: XR ABDOMEN 1 VIEW;  2/5/2024 2:24 am   INDICATION: Signs/Symptoms:NG placement.   COMPARISON: CT abdomen and pelvis 02/04/2024.   ACCESSION NUMBER(S): [id number]   ORDERING CLINICIAN: SEB" ABRAHAN   FINDINGS: AP portable view of the abdomen was obtained.   An enteric tube terminates at the mid upper abdomen with the side hole in the region of the GE junction.   There is gaseous distention of the visualized bowel loops.   Surgical clips are noted at the right upper quadrant.   No consolidation or pleural effusion at the visualized lungs.   Multilevel degenerative changes at the spine.       1. Enteric tube terminates at the mid upper abdomen with the side hole in the region of the GE junction, advancement by approximately 5-7 cm recommended. 2. Gaseous distention of the visualized bowel loops, better evaluated on prior CT abdomen and pelvis.   MACRO: None   Signed by: Beth Erwin 2/5/2024 2:45 AM Dictation workstation:   [id number]    CT abdomen pelvis w IV contrast    Result Date: 2/5/2024  Interpreted By:  Lis Valdivia, STUDY: CT ABDOMEN PELVIS W IV CONTRAST;  2/4/2024 10:19 pm   INDICATION: Signs/Symptoms:Abdominal pain.   COMPARISON: 05/07/2018   ACCESSION NUMBER(S): [id number]   ORDERING CLINICIAN: SEB GAUTHIER   TECHNIQUE: Axial CT images of the abdomen and pelvis with coronal and sagittal reconstructed images obtained after intravenous administration of contrast   FINDINGS: LOWER CHEST: Bibasilar-dependent atelectasis. Small hiatal hernia. BONES: No acute osseous abnormality. Mild diffuse degenerative disc changes. Mild bilateral hip osteoarthrosis. ABDOMINAL WALL: Small fat containing umbilical hernia.   ABDOMEN:   LIVER: Subcentimeter hypodense lesion in the dome of the liver is too small to characterize, but statistically likely a small cyst. BILE DUCTS: No biliary dilatation. GALLBLADDER: Cholecystectomy. PANCREAS: Within normal limits. SPLEEN: Within normal limits. ADRENALS: Within normal limits. KIDNEYS and URETERS: Symmetric renal enhancement. No hydronephrosis or perinephric fluid collection.     VESSELS: No aortic aneurysm. RETROPERITONEUM: No pathologically enlarged  retroperitoneal lymph nodes.   PELVIS:   REPRODUCTIVE ORGANS: The uterus and ovaries appear unremarkable. Tubal ligation clips are noted. BLADDER: Within normal limits.   BOWEL: Sleeve gastrectomy. The stomach is nondistended. There are multiple loops of dilated proximal small bowel. No discrete transition point, however, there is gradual narrowing of the bowel in the right mid abdomen, with associated mild wall thickening. The distal small bowel is decompressed. Normal appendix. PERITONEUM: There is a moderate amount of simple free pelvic fluid/ascites. No pneumoperitoneum.       Multiple dilated loops of small bowel with gradual transitional narrowing associated with mild the thickened bowel loops. Findings are suspicious for enteritis with ileus or partial obstruction.   Mild ascites.   Additional chronic findings as above.       MACRO: None   Signed by: Lis Valdivia 2/5/2024 12:39 AM Dictation workstation:   [id number]       Assessment:    48-year-old female with history of gastric sleeve and cholecystectomy presenting with abdominal pain, nausea, vomiting, and bloating.  On exam, abdomen is soft, nondistended, decreased bowel sounds, generalized tenderness to palpation worse in upper abdomen.  Vital signs stable, WBC normal.  CT abdomen and pelvis yesterday concerning for either enteritis with ileus or partial obstruction.  Findings consistent with small bowel obstruction.    Plan:  -NPO with ice chips  -NG tube to low intermittent wall suction  -IVF  -Pain control  -Nausea control  -DVT prophylaxis per primary team- Lovenox  -Encourage ambulation  -Continue care per primary team   -No acute surgical intervention needed at this time. General Surgery will continue to follow.    Further recommendations per Dr. Singh     Time spent  60  minutes obtaining labs, imaging, recommendations, interview, assessment, examination, medication review/ordering, and EMR review.    Plan of care was discussed extensively  with patient. Patient verbalized understanding through teach back method. All questions and concerns addressed upon examination.     Of note, this documentation is completed using the Dragon Dictation system (voice recognition software). There may be spelling and/or grammatical errors that were not corrected prior to final submission.    Electronically signed by Julita Waters PA-C on 2/5/2024 at 1:54 PM     Agree with note above except were noted below    48-year-old female with past medical history significant for sleeve gastrectomy, cholecystectomy who presented to [facility] with several day history of abdominal pain, nausea vomiting and obstipation.  She afebrile, vitals are stable, white cell count was 8.1K.  CT abdomen pelvis demonstrated concern for small bowel obstruction.  General surgery was consulted further workup and management    On physical exam the patient is in no acute distress, she has mild abdominal tenderness in upper abdomen, but is not peritonitic.  Abdomen is otherwise nondistended and soft.    48-year-old female with small bowel obstruction, likely due to adhesive disease.  Since the patient is a sleeve gastrectomy, I do not have a high level suspicion for internal hernia or some other need to have an emergency exploratory laparotomy immediately.  The patient has normal white cell count and vitals.  -No acute surgical intervention at this time  -IV fluids, nasogastric tube, n.p.o. although okay with sips while NG tube is in place  -Will continue to follow for now, cont. Care per primary team    Abdirahman Singh MD  02/05/24  2:24 PM

## 2024-02-05 NOTE — ED PROVIDER NOTES
HPI   Chief Complaint   Patient presents with    Abdominal Pain     Lower since Friday.  Says when she applies ice packs  it has helped       chief complaint abdominal pain    History of present illness this is a 48-year-old  female who is here with generalized abdominal pain since Friday distention with accompanying nausea and episode of emesis.  She has had a history of a gastrectomy cholecystectomy hiatal hernia repair with gastric sleeve patient was triaged to room 23                          Jonathan Coma Scale Score: 15                  Patient History   History reviewed. No pertinent past medical history.  Past Surgical History:   Procedure Laterality Date    OTHER SURGICAL HISTORY  11/26/2018    Gallbladder surgery    TONSILLECTOMY      TUBAL LIGATION       No family history on file.  Social History     Tobacco Use    Smoking status: Never    Smokeless tobacco: Never   Substance Use Topics    Alcohol use: Not on file    Drug use: Not on file       Physical Exam   ED Triage Vitals [02/04/24 1956]   Temperature Heart Rate Respirations BP   36.2 °C (97.2 °F) 91 18 (!) 138/98      Pulse Ox Temp Source Heart Rate Source Patient Position   99 % Temporal -- Sitting      BP Location FiO2 (%)     Right arm --       Physical Exam  Vitals and nursing note reviewed.   Constitutional:       General: She is in acute distress.      Appearance: She is normal weight. She is ill-appearing.   HENT:      Head: Normocephalic and atraumatic.   Eyes:      Extraocular Movements: Extraocular movements intact.      Pupils: Pupils are equal, round, and reactive to light.   Cardiovascular:      Rate and Rhythm: Normal rate and regular rhythm.   Pulmonary:      Effort: Pulmonary effort is normal.      Breath sounds: Normal breath sounds.   Abdominal:      General: Bowel sounds are normal. There is distension.      Tenderness: There is abdominal tenderness.   Skin:     General: Skin is warm.      Capillary Refill: Capillary  refill takes less than 2 seconds.   Neurological:      Mental Status: She is alert.         ED Course & [facility]   Diagnoses as of 02/05/24 0743   Small bowel obstruction (CMS/HCC)       Medical Decision Making  You okay differential diagnosis considered for this patient  #1 diverticulitis  #2 ureteral stone  #3 pneumoperitoneum  #4 pancreatitis  #5 urinary tract infection  #6 bowel obstruction  #7 terminal ileitis  Considering the above differential diagnosis following testing treatments were considered in order with shared decision-making  CT of the abdomen pelvis IV contrast IV fluid bolus Zofran IV Dilaudid IV  Urinalysis, CBC hepatic profile lactic acid amylase lipase NG       Amount and/or Complexity of Data Reviewed  Labs: ordered. Decision-making details documented in ED Course.     Details: urine pregnancy is negative urinalysis was negative urine pregnancy was negative  Radiology: ordered and independent interpretation performed.     Details: CT reveals a small bowel obstruction partial  Discussion of management or test interpretation with external provider(s): Case discussed with the Dr. Villalta, case discussed with Dr. Oseguera, patient was counseled    Risk  Decision regarding hospitalization.      Labs Reviewed   COMPREHENSIVE METABOLIC PANEL - Abnormal       Result Value    Glucose 104 (*)     Sodium 137      Potassium 4.0      Chloride 99      Bicarbonate 30      Anion Gap 12      Urea Nitrogen 10      Creatinine 0.63      eGFR >90      Calcium 9.6      Albumin 4.4      Alkaline Phosphatase 56      Total Protein 7.0      AST 19      Bilirubin, Total 0.7      ALT 16     URINALYSIS WITH REFLEX CULTURE AND MICROSCOPIC - Abnormal    Color, Urine Yellow      Appearance, Urine Clear      Specific Gravity, Urine 1.010      pH, Urine 6.0      Protein, Urine NEGATIVE      Glucose, Urine NEGATIVE      Blood, Urine MODERATE (2+) (*)     Ketones, Urine 5 (TRACE) (*)     Bilirubin, Urine NEGATIVE      Urobilinogen, Urine  <2.0      Nitrite, Urine NEGATIVE      Leukocyte Esterase, Urine TRACE (*)    MICROSCOPIC ONLY, URINE - Abnormal    WBC, Urine 1-5      RBC, Urine 6-10 (*)     Squamous Epithelial Cells, Urine 1-9 (SPARSE)      Bacteria, Urine 1+ (*)     Mucus, Urine 1+      Hyaline Casts, Urine OCCASIONAL (*)    COMPREHENSIVE METABOLIC PANEL - Abnormal    Glucose 143 (*)     Sodium 137      Potassium 4.0      Chloride 103      Bicarbonate 27      Anion Gap 11      Urea Nitrogen 8      Creatinine 0.56      eGFR >90      Calcium 8.7      Albumin 3.9      Alkaline Phosphatase 50      Total Protein 6.2 (*)     AST 16      Bilirubin, Total 0.6      ALT 15     CBC WITH AUTO DIFFERENTIAL - Abnormal    WBC 5.9      nRBC 0.0      RBC 4.17      Hemoglobin 12.9      Hematocrit 36.5      MCV 88      MCH 30.9      MCHC 35.3      RDW 12.0      Platelets 203      Neutrophils % 82.4      Immature Granulocytes %, Automated 0.3      Lymphocytes % 12.1      Monocytes % 5.0      Eosinophils % 0.0      Basophils % 0.2      Neutrophils Absolute 4.82      Immature Granulocytes Absolute, Automated 0.02      Lymphocytes Absolute 0.71 (*)     Monocytes Absolute 0.29      Eosinophils Absolute 0.00      Basophils Absolute 0.01     MAGNESIUM - Abnormal    Magnesium 1.55 (*)    LIPASE - Normal    Lipase 14      Narrative:     Venipuncture immediately after or during the administration of Metamizole may lead to falsely low results. Testing should be performed immediately prior to Metamizole dosing.   LACTATE - Normal    Lactate 0.7      Narrative:     Venipuncture immediately after or during the administration of Metamizole may lead to falsely low results. Testing should be performed immediately  prior to Metamizole dosing.   AMYLASE - Normal    Amylase 33     POCT PREGNANCY, URINE - Normal    Preg Test, Ur Negative     URINE CULTURE   CBC WITH AUTO DIFFERENTIAL    WBC 8.1      nRBC 0.0      RBC 4.86      Hemoglobin 14.9      Hematocrit 42.4      MCV 87      MCH  30.7      MCHC 35.1      RDW 11.8      Platelets 283      Neutrophils % 58.6      Immature Granulocytes %, Automated 0.2      Lymphocytes % 31.3      Monocytes % 9.1      Eosinophils % 0.6      Basophils % 0.2      Neutrophils Absolute 4.73      Immature Granulocytes Absolute, Automated 0.02      Lymphocytes Absolute 2.53      Monocytes Absolute 0.74      Eosinophils Absolute 0.05      Basophils Absolute 0.02     URINALYSIS WITH REFLEX CULTURE AND MICROSCOPIC    Narrative:     The following orders were created for panel order Urinalysis with Reflex Culture and Microscopic.  Procedure                               Abnormality         Status                     ---------                               -----------         ------                     Urinalysis with Reflex C...[[id number]]  Abnormal            Final result               Extra Urine Gray Tube[[id number]]                            In process                   Please view results for these tests on the individual orders.   EXTRA URINE GRAY TUBE        XR abdomen 1 view   Final Result   1. Enteric tube terminates at the mid upper abdomen with the side   hole in the region of the GE junction, advancement by approximately   5-7 cm recommended.   2. Gaseous distention of the visualized bowel loops, better evaluated   on prior CT abdomen and pelvis.        MACRO:   None        Signed by: Beth Erwin 2/5/2024 2:45 AM   Dictation workstation:   [id number]      CT abdomen pelvis w IV contrast   Final Result   Multiple dilated loops of small bowel with gradual transitional   narrowing associated with mild the thickened bowel loops. Findings   are suspicious for enteritis with ileus or partial obstruction.        Mild ascites.        Additional chronic findings as above.                  MACRO:   None        Signed by: Lis Valdivia 2/5/2024 12:39 AM   Dictation workstation:   [id number]             Procedure  Procedures     Triny Avitia MD  02/05/24  0125       Triny Avitia MD  02/05/24 0744

## 2024-02-05 NOTE — CARE PLAN
Patient seen and examined.  She complains of discomfort from the NG tube.  For this she was not having any vomiting at home, still complaining of abdominal pain, pain is a little better since the NG tube was placed in.  Last bowel movement was on Friday.  Denies any previous history of ileus versus bowel obstruction.  Magnesium is low which I will replete.  General surgery has been consulted.  Keep n.p.o. for now, continue IV fluids.  Since she is not having any nausea or vomiting and was not having any vomiting NG tube can potentially be pulled will let general surgery decide.  Patient started on IV pain meds.

## 2024-02-06 LAB
ALBUMIN SERPL BCP-MCNC: 3.8 G/DL (ref 3.4–5)
ALP SERPL-CCNC: 51 U/L (ref 33–110)
ALT SERPL W P-5'-P-CCNC: 15 U/L (ref 7–45)
ANION GAP SERPL CALC-SCNC: 12 MMOL/L (ref 10–20)
AST SERPL W P-5'-P-CCNC: 18 U/L (ref 9–39)
BACTERIA UR CULT: NORMAL
BASOPHILS # BLD AUTO: 0 X10*3/UL (ref 0–0.1)
BASOPHILS NFR BLD AUTO: 0 %
BILIRUB SERPL-MCNC: 0.6 MG/DL (ref 0–1.2)
BUN SERPL-MCNC: 6 MG/DL (ref 6–23)
CALCIUM SERPL-MCNC: 8.3 MG/DL (ref 8.6–10.3)
CHLORIDE SERPL-SCNC: 105 MMOL/L (ref 98–107)
CO2 SERPL-SCNC: 26 MMOL/L (ref 21–32)
CREAT SERPL-MCNC: 0.49 MG/DL (ref 0.5–1.05)
EGFRCR SERPLBLD CKD-EPI 2021: >90 ML/MIN/1.73M*2
EOSINOPHIL # BLD AUTO: 0.02 X10*3/UL (ref 0–0.7)
EOSINOPHIL NFR BLD AUTO: 0.3 %
ERYTHROCYTE [DISTWIDTH] IN BLOOD BY AUTOMATED COUNT: 11.9 % (ref 11.5–14.5)
GLUCOSE SERPL-MCNC: 95 MG/DL (ref 74–99)
HCT VFR BLD AUTO: 36.6 % (ref 36–46)
HGB BLD-MCNC: 12.4 G/DL (ref 12–16)
IMM GRANULOCYTES # BLD AUTO: 0.02 X10*3/UL (ref 0–0.7)
IMM GRANULOCYTES NFR BLD AUTO: 0.3 % (ref 0–0.9)
LYMPHOCYTES # BLD AUTO: 1.67 X10*3/UL (ref 1.2–4.8)
LYMPHOCYTES NFR BLD AUTO: 26 %
MAGNESIUM SERPL-MCNC: 1.91 MG/DL (ref 1.6–2.4)
MCH RBC QN AUTO: 30 PG (ref 26–34)
MCHC RBC AUTO-ENTMCNC: 33.9 G/DL (ref 32–36)
MCV RBC AUTO: 89 FL (ref 80–100)
MONOCYTES # BLD AUTO: 0.48 X10*3/UL (ref 0.1–1)
MONOCYTES NFR BLD AUTO: 7.5 %
NEUTROPHILS # BLD AUTO: 4.24 X10*3/UL (ref 1.2–7.7)
NEUTROPHILS NFR BLD AUTO: 65.9 %
NRBC BLD-RTO: 0 /100 WBCS (ref 0–0)
PLATELET # BLD AUTO: 192 X10*3/UL (ref 150–450)
POTASSIUM SERPL-SCNC: 3.3 MMOL/L (ref 3.5–5.3)
PROT SERPL-MCNC: 6 G/DL (ref 6.4–8.2)
RBC # BLD AUTO: 4.13 X10*6/UL (ref 4–5.2)
SODIUM SERPL-SCNC: 140 MMOL/L (ref 136–145)
WBC # BLD AUTO: 6.4 X10*3/UL (ref 4.4–11.3)

## 2024-02-06 PROCEDURE — 1100000001 HC PRIVATE ROOM DAILY

## 2024-02-06 PROCEDURE — 2500000004 HC RX 250 GENERAL PHARMACY W/ HCPCS (ALT 636 FOR OP/ED): Performed by: NURSE PRACTITIONER

## 2024-02-06 PROCEDURE — 99232 SBSQ HOSP IP/OBS MODERATE 35: CPT

## 2024-02-06 PROCEDURE — 2500000004 HC RX 250 GENERAL PHARMACY W/ HCPCS (ALT 636 FOR OP/ED): Performed by: STUDENT IN AN ORGANIZED HEALTH CARE EDUCATION/TRAINING PROGRAM

## 2024-02-06 PROCEDURE — 80053 COMPREHEN METABOLIC PANEL: CPT | Performed by: NURSE PRACTITIONER

## 2024-02-06 PROCEDURE — 85025 COMPLETE CBC W/AUTO DIFF WBC: CPT | Performed by: NURSE PRACTITIONER

## 2024-02-06 PROCEDURE — 99231 SBSQ HOSP IP/OBS SF/LOW 25: CPT | Performed by: SURGERY

## 2024-02-06 PROCEDURE — 36415 COLL VENOUS BLD VENIPUNCTURE: CPT | Performed by: NURSE PRACTITIONER

## 2024-02-06 PROCEDURE — 2500000004 HC RX 250 GENERAL PHARMACY W/ HCPCS (ALT 636 FOR OP/ED): Performed by: INTERNAL MEDICINE

## 2024-02-06 PROCEDURE — 83735 ASSAY OF MAGNESIUM: CPT | Performed by: NURSE PRACTITIONER

## 2024-02-06 PROCEDURE — 99231 SBSQ HOSP IP/OBS SF/LOW 25: CPT | Performed by: INTERNAL MEDICINE

## 2024-02-06 RX ORDER — POTASSIUM CHLORIDE 14.9 MG/ML
20 INJECTION INTRAVENOUS
Status: COMPLETED | OUTPATIENT
Start: 2024-02-06 | End: 2024-02-06

## 2024-02-06 RX ORDER — ENOXAPARIN SODIUM 100 MG/ML
40 INJECTION SUBCUTANEOUS EVERY 24 HOURS
Status: DISCONTINUED | OUTPATIENT
Start: 2024-02-06 | End: 2024-02-10 | Stop reason: HOSPADM

## 2024-02-06 RX ADMIN — ENOXAPARIN SODIUM 40 MG: 40 INJECTION SUBCUTANEOUS at 13:47

## 2024-02-06 RX ADMIN — SODIUM CHLORIDE 100 ML/HR: 9 INJECTION, SOLUTION INTRAVENOUS at 07:56

## 2024-02-06 RX ADMIN — SODIUM CHLORIDE 100 ML/HR: 9 INJECTION, SOLUTION INTRAVENOUS at 21:43

## 2024-02-06 RX ADMIN — POTASSIUM CHLORIDE 20 MEQ: 14.9 INJECTION, SOLUTION INTRAVENOUS at 10:53

## 2024-02-06 RX ADMIN — KETOROLAC TROMETHAMINE 30 MG: 30 INJECTION, SOLUTION INTRAMUSCULAR; INTRAVENOUS at 20:27

## 2024-02-06 RX ADMIN — POTASSIUM CHLORIDE 20 MEQ: 14.9 INJECTION, SOLUTION INTRAVENOUS at 09:16

## 2024-02-06 RX ADMIN — KETOROLAC TROMETHAMINE 30 MG: 30 INJECTION, SOLUTION INTRAMUSCULAR; INTRAVENOUS at 07:56

## 2024-02-06 RX ADMIN — MORPHINE SULFATE 2 MG: 2 INJECTION, SOLUTION INTRAMUSCULAR; INTRAVENOUS at 02:20

## 2024-02-06 SDOH — ECONOMIC STABILITY: HOUSING INSECURITY: IN THE LAST 12 MONTHS, WAS THERE A TIME WHEN YOU WERE NOT ABLE TO PAY THE MORTGAGE OR RENT ON TIME?: NO

## 2024-02-06 SDOH — ECONOMIC STABILITY: FOOD INSECURITY: WITHIN THE PAST 12 MONTHS, THE FOOD YOU BOUGHT JUST DIDN'T LAST AND YOU DIDN'T HAVE MONEY TO GET MORE.: NEVER TRUE

## 2024-02-06 SDOH — ECONOMIC STABILITY: TRANSPORTATION INSECURITY
IN THE PAST 12 MONTHS, HAS LACK OF TRANSPORTATION KEPT YOU FROM MEETINGS, WORK, OR FROM GETTING THINGS NEEDED FOR DAILY LIVING?: NO

## 2024-02-06 SDOH — ECONOMIC STABILITY: INCOME INSECURITY: IN THE LAST 12 MONTHS, WAS THERE A TIME WHEN YOU WERE NOT ABLE TO PAY THE MORTGAGE OR RENT ON TIME?: NO

## 2024-02-06 SDOH — ECONOMIC STABILITY: TRANSPORTATION INSECURITY

## 2024-02-06 SDOH — ECONOMIC STABILITY: HOUSING INSECURITY
IN THE LAST 12 MONTHS, WAS THERE A TIME WHEN YOU DID NOT HAVE A STEADY PLACE TO SLEEP OR SLEPT IN A SHELTER (INCLUDING NOW)?: NO

## 2024-02-06 SDOH — ECONOMIC STABILITY: FOOD INSECURITY: WITHIN THE PAST 12 MONTHS, THE FOOD YOU BOUGHT JUST DIDN’T LAST AND YOU DIDN’T HAVE MONEY TO GET MORE.: NEVER TRUE

## 2024-02-06 SDOH — ECONOMIC STABILITY: HOUSING INSECURITY: IN THE PAST 12 MONTHS HAS THE ELECTRIC, GAS, OIL, OR WATER COMPANY THREATENED TO SHUT OFF SERVICES IN YOUR HOME?: NO

## 2024-02-06 SDOH — ECONOMIC STABILITY: GENERAL

## 2024-02-06 SDOH — ECONOMIC STABILITY: FOOD INSECURITY: WITHIN THE PAST 12 MONTHS, YOU WORRIED THAT YOUR FOOD WOULD RUN OUT BEFORE YOU GOT MONEY TO BUY MORE.: NEVER TRUE

## 2024-02-06 SDOH — ECONOMIC STABILITY: HOUSING INSECURITY: IN THE LAST 12 MONTHS, HOW MANY PLACES HAVE YOU LIVED?: 1

## 2024-02-06 SDOH — ECONOMIC STABILITY: TRANSPORTATION INSECURITY
IN THE PAST 12 MONTHS, HAS THE LACK OF TRANSPORTATION KEPT YOU FROM MEDICAL APPOINTMENTS OR FROM GETTING MEDICATIONS?: NO

## 2024-02-06 SDOH — ECONOMIC STABILITY: HOUSING INSECURITY

## 2024-02-06 SDOH — ECONOMIC STABILITY: FOOD INSECURITY: WITHIN THE PAST 12 MONTHS, YOU WORRIED THAT YOUR FOOD WOULD RUN OUT BEFORE YOU GOT THE MONEY TO BUY MORE.: NEVER TRUE

## 2024-02-06 SDOH — ECONOMIC STABILITY: FOOD INSECURITY

## 2024-02-06 SDOH — ECONOMIC STABILITY: TRANSPORTATION INSECURITY: IN THE PAST 12 MONTHS, HAS LACK OF TRANSPORTATION KEPT YOU FROM MEDICAL APPOINTMENTS OR FROM GETTING MEDICATIONS?: NO

## 2024-02-06 ASSESSMENT — COGNITIVE AND FUNCTIONAL STATUS - GENERAL
DAILY ACTIVITIY SCORE: 24
MOBILITY SCORE: 24

## 2024-02-06 ASSESSMENT — ACTIVITIES OF DAILY LIVING (ADL)
LACK_OF_TRANSPORTATION: NO
LACK_OF_TRANSPORTATION: NO

## 2024-02-06 ASSESSMENT — PAIN DESCRIPTION - LOCATION: LOCATION: ABDOMEN

## 2024-02-06 ASSESSMENT — PAIN SCALES - GENERAL
PAINLEVEL_OUTOF10: 5 - MODERATE PAIN
PAINLEVEL_OUTOF10: 7
PAINLEVEL_OUTOF10: 1

## 2024-02-06 ASSESSMENT — SOCIAL DETERMINANTS OF HEALTH (SDOH): IN THE PAST 12 MONTHS, HAS THE ELECTRIC, GAS, OIL, OR WATER COMPANY THREATENED TO SHUT OFF SERVICE IN YOUR HOME?: NO

## 2024-02-06 ASSESSMENT — PAIN - FUNCTIONAL ASSESSMENT
PAIN_FUNCTIONAL_ASSESSMENT: 0-10

## 2024-02-06 NOTE — PROGRESS NOTES
General Surgery Progress Note    Patient: Linda Rankin  Unit/Bed: 620/620-A  YOB: 1975  MRN: [id number]  Acct: [id number]   Admitting Diagnosis: Ileus (CMS/[facility]) [K56.7]  Small bowel obstruction (CMS/HCC) [K56.609]  Date:  2/4/2024  Hospital Day: 1  Attending: Abdirahman Singh MD    Complaint:  Chief Complaint   Patient presents with    Abdominal Pain     Lower since Friday.  Says when she applies ice packs  it has helped      Subjective  Patient seen and examined this morning. No acute events overnight.  She states she feels a little better but is still having abdominal pain.  She denies nausea or vomiting.  She has not had a bowel movement or passed gas.  PHYSICAL EXAM:  Physical Exam  Constitutional:       General: She is not in acute distress.  HENT:      Head: Normocephalic and atraumatic.      Mouth/Throat:      Mouth: Mucous membranes are moist.   Eyes:      Conjunctiva/sclera: Conjunctivae normal.   Pulmonary:      Effort: Pulmonary effort is normal. No respiratory distress.   Abdominal:      Palpations: Abdomen is soft.      Tenderness: There is abdominal tenderness. There is no guarding.      Comments: Approximately 200 mL dark brown output from NG tube in canister.   Musculoskeletal:         General: No swelling.   Skin:     General: Skin is warm and dry.   Neurological:      Mental Status: She is alert.   Psychiatric:         Mood and Affect: Mood normal.         Behavior: Behavior normal.       Vital signs in last 24 hours:  Vitals:    02/06/24 1355   BP: 128/84   Pulse: 85   Resp: 18   Temp: 36.8 °C (98.2 °F)   SpO2: 97%     Intake/Output this shift:    Intake/Output Summary (Last 24 hours) at 2/6/2024 1442  Last data filed at 2/6/2024 1350  Gross per 24 hour   Intake 2366.67 ml   Output 50 ml   Net 2316.67 ml      Allergies:  Allergies   Allergen Reactions    Amlodipine Unknown     Other reaction(s): Other: See Comments   edema    edema      Medications:  Scheduled  medications  enoxaparin, 40 mg, subcutaneous, q24h  pantoprazole, 40 mg, oral, Daily   Or  pantoprazole, 40 mg, intravenous, Daily      Continuous medications  sodium chloride 0.9%, 100 mL/hr, Last Rate: 100 mL/hr (02/06/24 0950)      PRN medications  PRN medications: acetaminophen **OR** acetaminophen **OR** acetaminophen, ketorolac, morphine, ondansetron **OR** ondansetron  Labs:  Results for orders placed or performed during the hospital encounter of 02/04/24 (from the past 24 hour(s))   Comprehensive metabolic panel   Result Value Ref Range    Glucose 95 74 - 99 mg/dL    Sodium 140 136 - 145 mmol/L    Potassium 3.3 (L) 3.5 - 5.3 mmol/L    Chloride 105 98 - 107 mmol/L    Bicarbonate 26 21 - 32 mmol/L    Anion Gap 12 10 - 20 mmol/L    Urea Nitrogen 6 6 - 23 mg/dL    Creatinine 0.49 (L) 0.50 - 1.05 mg/dL    eGFR >90 >60 mL/min/1.73m*2    Calcium 8.3 (L) 8.6 - 10.3 mg/dL    Albumin 3.8 3.4 - 5.0 g/dL    Alkaline Phosphatase 51 33 - 110 U/L    Total Protein 6.0 (L) 6.4 - 8.2 g/dL    AST 18 9 - 39 U/L    Bilirubin, Total 0.6 0.0 - 1.2 mg/dL    ALT 15 7 - 45 U/L   CBC and Auto Differential   Result Value Ref Range    WBC 6.4 4.4 - 11.3 x10*3/uL    nRBC 0.0 0.0 - 0.0 /100 WBCs    RBC 4.13 4.00 - 5.20 x10*6/uL    Hemoglobin 12.4 12.0 - 16.0 g/dL    Hematocrit 36.6 36.0 - 46.0 %    MCV 89 80 - 100 fL    MCH 30.0 26.0 - 34.0 pg    MCHC 33.9 32.0 - 36.0 g/dL    RDW 11.9 11.5 - 14.5 %    Platelets 192 150 - 450 x10*3/uL    Neutrophils % 65.9 40.0 - 80.0 %    Immature Granulocytes %, Automated 0.3 0.0 - 0.9 %    Lymphocytes % 26.0 13.0 - 44.0 %    Monocytes % 7.5 2.0 - 10.0 %    Eosinophils % 0.3 0.0 - 6.0 %    Basophils % 0.0 0.0 - 2.0 %    Neutrophils Absolute 4.24 1.20 - 7.70 x10*3/uL    Immature Granulocytes Absolute, Automated 0.02 0.00 - 0.70 x10*3/uL    Lymphocytes Absolute 1.67 1.20 - 4.80 x10*3/uL    Monocytes Absolute 0.48 0.10 - 1.00 x10*3/uL    Eosinophils Absolute 0.02 0.00 - 0.70 x10*3/uL    Basophils Absolute  0.00 0.00 - 0.10 x10*3/uL   Magnesium   Result Value Ref Range    Magnesium 1.91 1.60 - 2.40 mg/dL      Imaging:  XR abdomen 1 view    Result Date: 2/5/2024  Interpreted By:  Beth Erwin, STUDY: XR ABDOMEN 1 VIEW;  2/5/2024 2:24 am   INDICATION: Signs/Symptoms:NG placement.   COMPARISON: CT abdomen and pelvis 02/04/2024.   ACCESSION NUMBER(S): [id number]   ORDERING CLINICIAN: SEB GAUTHIER   FINDINGS: AP portable view of the abdomen was obtained.   An enteric tube terminates at the mid upper abdomen with the side hole in the region of the GE junction.   There is gaseous distention of the visualized bowel loops.   Surgical clips are noted at the right upper quadrant.   No consolidation or pleural effusion at the visualized lungs.   Multilevel degenerative changes at the spine.       1. Enteric tube terminates at the mid upper abdomen with the side hole in the region of the GE junction, advancement by approximately 5-7 cm recommended. 2. Gaseous distention of the visualized bowel loops, better evaluated on prior CT abdomen and pelvis.   MACRO: None   Signed by: Beth Erwin 2/5/2024 2:45 AM Dictation workstation:   [id number]    CT abdomen pelvis w IV contrast    Result Date: 2/5/2024  Interpreted By:  Lis Valdivia, STUDY: CT ABDOMEN PELVIS W IV CONTRAST;  2/4/2024 10:19 pm   INDICATION: Signs/Symptoms:Abdominal pain.   COMPARISON: 05/07/2018   ACCESSION NUMBER(S): [id number]   ORDERING CLINICIAN: SEB GAUTHIER   TECHNIQUE: Axial CT images of the abdomen and pelvis with coronal and sagittal reconstructed images obtained after intravenous administration of contrast   FINDINGS: LOWER CHEST: Bibasilar-dependent atelectasis. Small hiatal hernia. BONES: No acute osseous abnormality. Mild diffuse degenerative disc changes. Mild bilateral hip osteoarthrosis. ABDOMINAL WALL: Small fat containing umbilical hernia.   ABDOMEN:   LIVER: Subcentimeter hypodense lesion in the dome of the liver is too small to  characterize, but statistically likely a small cyst. BILE DUCTS: No biliary dilatation. GALLBLADDER: Cholecystectomy. PANCREAS: Within normal limits. SPLEEN: Within normal limits. ADRENALS: Within normal limits. KIDNEYS and URETERS: Symmetric renal enhancement. No hydronephrosis or perinephric fluid collection.     VESSELS: No aortic aneurysm. RETROPERITONEUM: No pathologically enlarged retroperitoneal lymph nodes.   PELVIS:   REPRODUCTIVE ORGANS: The uterus and ovaries appear unremarkable. Tubal ligation clips are noted. BLADDER: Within normal limits.   BOWEL: Sleeve gastrectomy. The stomach is nondistended. There are multiple loops of dilated proximal small bowel. No discrete transition point, however, there is gradual narrowing of the bowel in the right mid abdomen, with associated mild wall thickening. The distal small bowel is decompressed. Normal appendix. PERITONEUM: There is a moderate amount of simple free pelvic fluid/ascites. No pneumoperitoneum.       Multiple dilated loops of small bowel with gradual transitional narrowing associated with mild the thickened bowel loops. Findings are suspicious for enteritis with ileus or partial obstruction.   Mild ascites.   Additional chronic findings as above.       MACRO: None   Signed by: Lis Valdivia 2/5/2024 12:39 AM Dictation workstation:   [id number]     Assessment  No acute events overnight.  Patient feels a little better but is still having abdominal pain.  She denies nausea or vomiting.  She has not had a bowel movement or passed gas.  Abdomen is soft but tender.  WBC normal.  Potassium low at 3.3 and was replaced.    Plan  -NPO with ice chips  -NG tube to low intermittent wall suction. Awaiting return of bowel function  -IVF  -Pain control  -Nausea control  -DVT prophylaxis- Lovenox  -Encourage ambulation  -General Surgery to take over as primary    Further recommendations per Dr. Samantha Waters PA-C    Time spent  40  minutes obtaining labs,  imaging, recommendations, interview, assessment, examination, medication review/ordering, and EMR review.    Plan of care was discussed extensively with patient. Patient verbalized understanding through teach back method. All questions and concerns addressed upon examination.     Of note, this documentation is completed using the Dragon Dictation system (voice recognition software). There may be spelling and/or grammatical errors that were not corrected prior to final submission.     Agree with note above except were noted below    No acute events overnight, patient denies any real flatus although she does appear to be feeling better, NG tube in place with minimal bilious output (300ml out over the last 24 hours).      On physical exam patient is in no acute distress, abdomen soft, nondistended, nontender to palpation, NG tube in place with bilious output    48-year-old female who has a past medical history significant for sleeve gastrectomy who presents with concerns of a small bowel obstruction.  Has only been in the hospital for 24 hours.  Will continue nonoperative management  -N.p.o., IV fluids  -NG tube  -Out of bed, SCDs, incentive spirometry, DVT prophylaxis  -Will take over care from a primary surgical standpoint  -Patient significant believed next 1 to 4 to 48 hours, will get small bowel follow-through    Abdirahman Singh MD  02/06/24  6:11 PM

## 2024-02-06 NOTE — CARE PLAN
Problem: Pain - Adult  Goal: Verbalizes/displays adequate comfort level or baseline comfort level  Outcome: Progressing     Problem: Safety - Adult  Goal: Free from fall injury  Outcome: Progressing   .

## 2024-02-06 NOTE — PROGRESS NOTES
02/06/24 1321   Discharge Planning   Living Arrangements Parent   Support Systems Children;Family members;Parent   Assistance Needed PTA ind ADLS and IADSL no AD, no falls, drives, works   Type of Residence Private residence  (2 floor home, bed/bath on main floor)   Number of Stairs to Enter Residence 4  (with handrail)   Number of Stairs Within Residence 12   Do you have animals or pets at home? Yes   Type of Animals or Pets 2 cats   Home or Post Acute Services None   Patient expects to be discharged to: Home   Does the patient need discharge transport arranged? No  (mom to transport pt home)   Financial Resource Strain   How hard is it for you to pay for the very basics like food, housing, medical care, and heating? Not hard   Housing Stability   In the last 12 months, was there a time when you were not able to pay the mortgage or rent on time? N   In the last 12 months, how many places have you lived? 1   In the last 12 months, was there a time when you did not have a steady place to sleep or slept in a shelter (including now)? N   Transportation Needs   In the past 12 months, has lack of transportation kept you from medical appointments or from getting medications? no   In the past 12 months, has lack of transportation kept you from meetings, work, or from getting things needed for daily living? No     Pt admitted with small bowel obstruction,  No surgical intervention at this time, pt has NG tube currently. Pt anticipates home no needs at time of DC. CT team will monitor case for progression and potential DC needs.

## 2024-02-06 NOTE — CARE PLAN
Problem: Pain - Adult  Goal: Verbalizes/displays adequate comfort level or baseline comfort level  Outcome: Progressing     Problem: Safety - Adult  Goal: Free from fall injury  Outcome: Progressing     Problem: Discharge Planning  Goal: Discharge to home or other facility with appropriate resources  Outcome: Progressing

## 2024-02-06 NOTE — PROGRESS NOTES
ASSESSMENT & PLAN:     SBO  -p/w abd pain, distension, n/v, obstipation  -CT showed dilated small bowel loops with gradual transitional narrowing cf partial SBO vs ileus  -has hx of abd surgery with sleeve gastrectomy, likely sbo 2/2 adhesions  Plan:  -surgery consulted, following, conservative mgmt for now  -currently NPO, NGT to LIWS, mIVF  -pain, nausea control PRN    Hypokalemia  -repleted    ADHD  -resume home Adderall when able    Vte ppx lovenox    Christos Dove MD    SUBJECTIVE     NAEON. Says her NG was not functioning for a brief period of time yesterday, and after suction was resumed she started feeling better. Denies n/v. No BM. Passing gas however.     OBJECTIVE:       Last Recorded Vitals:  Vitals:    02/05/24 0804 02/05/24 1908 02/06/24 0224 02/06/24 0745   BP: 134/81 139/86 134/74 152/88   BP Location: Right arm      Patient Position: Lying      Pulse: 95 91 90 82   Resp: 16 16 16 18   Temp: 36.9 °C (98.4 °F) 36.3 °C (97.3 °F) 36.6 °C (97.9 °F) 36.4 °C (97.5 °F)   TempSrc: Temporal   Temporal   SpO2: 96% 96% 94% 96%   Weight:       Height:           Last I/O:  I/O last 3 completed shifts:  In: 1347.5 (14.4 mL/kg) [I.V.:347.5 (3.7 mL/kg); IV Piggyback:1000]  Out: - (0 mL/kg)   Weight: 93.7 kg     Physical Exam:  GEN: healthy appearing, appears stated age, NAD  HEENT: NGT in place with black output in cannister  CV: RRR, no m/r/g, no LE edema  LUNGS: CTAB  ABD: soft, NT, ND  SKIN: no rashes  MSK; no gross deformities, normal joints  NEURO: A+Ox3, no FND  PSYCH: appropriate mood, affect    Inpatient Medications:  pantoprazole, 40 mg, oral, Daily   Or  pantoprazole, 40 mg, intravenous, Daily  potassium chloride, 20 mEq, intravenous, q2h        PRN Medications  PRN medications: acetaminophen **OR** acetaminophen **OR** acetaminophen, ketorolac, morphine, ondansetron **OR** ondansetron    Continuous Medications:  sodium chloride 0.9%, 100 mL/hr, Last Rate: 100 mL/hr (02/06/24 2217)          LABS AND  IMAGING:     Labs:  Results for orders placed or performed during the hospital encounter of 02/04/24 (from the past 24 hour(s))   Comprehensive metabolic panel   Result Value Ref Range    Glucose 95 74 - 99 mg/dL    Sodium 140 136 - 145 mmol/L    Potassium 3.3 (L) 3.5 - 5.3 mmol/L    Chloride 105 98 - 107 mmol/L    Bicarbonate 26 21 - 32 mmol/L    Anion Gap 12 10 - 20 mmol/L    Urea Nitrogen 6 6 - 23 mg/dL    Creatinine 0.49 (L) 0.50 - 1.05 mg/dL    eGFR >90 >60 mL/min/1.73m*2    Calcium 8.3 (L) 8.6 - 10.3 mg/dL    Albumin 3.8 3.4 - 5.0 g/dL    Alkaline Phosphatase 51 33 - 110 U/L    Total Protein 6.0 (L) 6.4 - 8.2 g/dL    AST 18 9 - 39 U/L    Bilirubin, Total 0.6 0.0 - 1.2 mg/dL    ALT 15 7 - 45 U/L   CBC and Auto Differential   Result Value Ref Range    WBC 6.4 4.4 - 11.3 x10*3/uL    nRBC 0.0 0.0 - 0.0 /100 WBCs    RBC 4.13 4.00 - 5.20 x10*6/uL    Hemoglobin 12.4 12.0 - 16.0 g/dL    Hematocrit 36.6 36.0 - 46.0 %    MCV 89 80 - 100 fL    MCH 30.0 26.0 - 34.0 pg    MCHC 33.9 32.0 - 36.0 g/dL    RDW 11.9 11.5 - 14.5 %    Platelets 192 150 - 450 x10*3/uL    Neutrophils % 65.9 40.0 - 80.0 %    Immature Granulocytes %, Automated 0.3 0.0 - 0.9 %    Lymphocytes % 26.0 13.0 - 44.0 %    Monocytes % 7.5 2.0 - 10.0 %    Eosinophils % 0.3 0.0 - 6.0 %    Basophils % 0.0 0.0 - 2.0 %    Neutrophils Absolute 4.24 1.20 - 7.70 x10*3/uL    Immature Granulocytes Absolute, Automated 0.02 0.00 - 0.70 x10*3/uL    Lymphocytes Absolute 1.67 1.20 - 4.80 x10*3/uL    Monocytes Absolute 0.48 0.10 - 1.00 x10*3/uL    Eosinophils Absolute 0.02 0.00 - 0.70 x10*3/uL    Basophils Absolute 0.00 0.00 - 0.10 x10*3/uL   Magnesium   Result Value Ref Range    Magnesium 1.91 1.60 - 2.40 mg/dL        Imaging:  XR abdomen 1 view  Narrative: Interpreted By:  Beth Erwin,   STUDY:  XR ABDOMEN 1 VIEW;  2/5/2024 2:24 am      INDICATION:  Signs/Symptoms:NG placement.      COMPARISON:  CT abdomen and pelvis 02/04/2024.      ACCESSION  NUMBER(S):  [id number]      ORDERING CLINICIAN:  SEB GAUTHIER      FINDINGS:  AP portable view of the abdomen was obtained.      An enteric tube terminates at the mid upper abdomen with the side  hole in the region of the GE junction.      There is gaseous distention of the visualized bowel loops.      Surgical clips are noted at the right upper quadrant.      No consolidation or pleural effusion at the visualized lungs.      Multilevel degenerative changes at the spine.      Impression: 1. Enteric tube terminates at the mid upper abdomen with the side  hole in the region of the GE junction, advancement by approximately  5-7 cm recommended.  2. Gaseous distention of the visualized bowel loops, better evaluated  on prior CT abdomen and pelvis.      MACRO:  None      Signed by: Beth Erwin 2/5/2024 2:45 AM  Dictation workstation:   [id number]  CT abdomen pelvis w IV contrast  Narrative: Interpreted By:  Lis Valdivia,   STUDY:  CT ABDOMEN PELVIS W IV CONTRAST;  2/4/2024 10:19 pm      INDICATION:  Signs/Symptoms:Abdominal pain.      COMPARISON:  05/07/2018      ACCESSION NUMBER(S):  [id number]      ORDERING CLINICIAN:  SEB GAUTHIER      TECHNIQUE:  Axial CT images of the abdomen and pelvis with coronal and sagittal  reconstructed images obtained after intravenous administration of  contrast      FINDINGS:  LOWER CHEST: Bibasilar-dependent atelectasis. Small hiatal hernia.  BONES: No acute osseous abnormality. Mild diffuse degenerative disc  changes. Mild bilateral hip osteoarthrosis. ABDOMINAL WALL: Small fat  containing umbilical hernia.      ABDOMEN:      LIVER: Subcentimeter hypodense lesion in the dome of the liver is too  small to characterize, but statistically likely a small cyst. BILE  DUCTS: No biliary dilatation. GALLBLADDER: Cholecystectomy.  PANCREAS: Within normal limits.  SPLEEN: Within normal limits.  ADRENALS: Within normal limits.  KIDNEYS and URETERS: Symmetric renal enhancement. No  hydronephrosis  or perinephric fluid collection.          VESSELS: No aortic aneurysm.  RETROPERITONEUM: No pathologically enlarged retroperitoneal lymph  nodes.      PELVIS:      REPRODUCTIVE ORGANS: The uterus and ovaries appear unremarkable.  Tubal ligation clips are noted. BLADDER: Within normal limits.      BOWEL: Sleeve gastrectomy. The stomach is nondistended. There are  multiple loops of dilated proximal small bowel. No discrete  transition point, however, there is gradual narrowing of the bowel in  the right mid abdomen, with associated mild wall thickening. The  distal small bowel is decompressed. Normal appendix. PERITONEUM:  There is a moderate amount of simple free pelvic fluid/ascites. No  pneumoperitoneum.      Impression: Multiple dilated loops of small bowel with gradual transitional  narrowing associated with mild the thickened bowel loops. Findings  are suspicious for enteritis with ileus or partial obstruction.      Mild ascites.      Additional chronic findings as above.              MACRO:  None      Signed by: Lis Valdivia 2/5/2024 12:39 AM  Dictation workstation:   [id number]

## 2024-02-07 LAB
ANION GAP SERPL CALC-SCNC: 13 MMOL/L (ref 10–20)
BUN SERPL-MCNC: 7 MG/DL (ref 6–23)
CALCIUM SERPL-MCNC: 8.5 MG/DL (ref 8.6–10.3)
CHLORIDE SERPL-SCNC: 105 MMOL/L (ref 98–107)
CO2 SERPL-SCNC: 24 MMOL/L (ref 21–32)
CREAT SERPL-MCNC: 0.42 MG/DL (ref 0.5–1.05)
EGFRCR SERPLBLD CKD-EPI 2021: >90 ML/MIN/1.73M*2
ERYTHROCYTE [DISTWIDTH] IN BLOOD BY AUTOMATED COUNT: 11.6 % (ref 11.5–14.5)
GLUCOSE BLD MANUAL STRIP-MCNC: 68 MG/DL (ref 74–99)
GLUCOSE SERPL-MCNC: 69 MG/DL (ref 74–99)
HCT VFR BLD AUTO: 35.6 % (ref 36–46)
HGB BLD-MCNC: 12.2 G/DL (ref 12–16)
HOLD SPECIMEN: NORMAL
MAGNESIUM SERPL-MCNC: 1.65 MG/DL (ref 1.6–2.4)
MCH RBC QN AUTO: 30.1 PG (ref 26–34)
MCHC RBC AUTO-ENTMCNC: 34.3 G/DL (ref 32–36)
MCV RBC AUTO: 88 FL (ref 80–100)
NRBC BLD-RTO: 0 /100 WBCS (ref 0–0)
PLATELET # BLD AUTO: 191 X10*3/UL (ref 150–450)
POTASSIUM SERPL-SCNC: 3.7 MMOL/L (ref 3.5–5.3)
RBC # BLD AUTO: 4.05 X10*6/UL (ref 4–5.2)
SODIUM SERPL-SCNC: 138 MMOL/L (ref 136–145)
WBC # BLD AUTO: 6 X10*3/UL (ref 4.4–11.3)

## 2024-02-07 PROCEDURE — 36415 COLL VENOUS BLD VENIPUNCTURE: CPT

## 2024-02-07 PROCEDURE — 99231 SBSQ HOSP IP/OBS SF/LOW 25: CPT | Performed by: SURGERY

## 2024-02-07 PROCEDURE — 1100000001 HC PRIVATE ROOM DAILY

## 2024-02-07 PROCEDURE — 2500000004 HC RX 250 GENERAL PHARMACY W/ HCPCS (ALT 636 FOR OP/ED): Performed by: INTERNAL MEDICINE

## 2024-02-07 PROCEDURE — 2500000005 HC RX 250 GENERAL PHARMACY W/O HCPCS

## 2024-02-07 PROCEDURE — 85027 COMPLETE CBC AUTOMATED: CPT

## 2024-02-07 PROCEDURE — 2500000004 HC RX 250 GENERAL PHARMACY W/ HCPCS (ALT 636 FOR OP/ED): Performed by: NURSE PRACTITIONER

## 2024-02-07 PROCEDURE — 83735 ASSAY OF MAGNESIUM: CPT

## 2024-02-07 PROCEDURE — 99232 SBSQ HOSP IP/OBS MODERATE 35: CPT

## 2024-02-07 PROCEDURE — 82947 ASSAY GLUCOSE BLOOD QUANT: CPT

## 2024-02-07 PROCEDURE — 80048 BASIC METABOLIC PNL TOTAL CA: CPT

## 2024-02-07 PROCEDURE — C9113 INJ PANTOPRAZOLE SODIUM, VIA: HCPCS | Performed by: NURSE PRACTITIONER

## 2024-02-07 PROCEDURE — 2500000004 HC RX 250 GENERAL PHARMACY W/ HCPCS (ALT 636 FOR OP/ED): Performed by: STUDENT IN AN ORGANIZED HEALTH CARE EDUCATION/TRAINING PROGRAM

## 2024-02-07 RX ORDER — DEXTROSE 50 % IN WATER (D50W) INTRAVENOUS SYRINGE
25 ONCE
Status: COMPLETED | OUTPATIENT
Start: 2024-02-07 | End: 2024-02-07

## 2024-02-07 RX ORDER — DEXTROSE 50 % IN WATER (D50W) INTRAVENOUS SYRINGE
Status: COMPLETED
Start: 2024-02-07 | End: 2024-02-07

## 2024-02-07 RX ADMIN — ENOXAPARIN SODIUM 40 MG: 40 INJECTION SUBCUTANEOUS at 12:36

## 2024-02-07 RX ADMIN — PANTOPRAZOLE SODIUM 40 MG: 40 INJECTION, POWDER, FOR SOLUTION INTRAVENOUS at 05:11

## 2024-02-07 RX ADMIN — DEXTROSE MONOHYDRATE 25 G: 25 INJECTION, SOLUTION INTRAVENOUS at 21:27

## 2024-02-07 RX ADMIN — KETOROLAC TROMETHAMINE 30 MG: 30 INJECTION, SOLUTION INTRAMUSCULAR; INTRAVENOUS at 20:54

## 2024-02-07 RX ADMIN — DEXTROSE MONOHYDRATE 25 G: 25 INJECTION, SOLUTION INTRAVENOUS at 21:45

## 2024-02-07 ASSESSMENT — PAIN SCALES - PAIN ASSESSMENT IN ADVANCED DEMENTIA (PAINAD)
CONSOLABILITY: NO NEED TO CONSOLE
FACIALEXPRESSION: SMILING OR INEXPRESSIVE
TOTALSCORE: 0
BODYLANGUAGE: RELAXED
BREATHING: NORMAL

## 2024-02-07 ASSESSMENT — COGNITIVE AND FUNCTIONAL STATUS - GENERAL
MOVING TO AND FROM BED TO CHAIR: A LITTLE
STANDING UP FROM CHAIR USING ARMS: A LITTLE
DAILY ACTIVITIY SCORE: 24
CLIMB 3 TO 5 STEPS WITH RAILING: A LITTLE
MOBILITY SCORE: 24
WALKING IN HOSPITAL ROOM: A LITTLE
DAILY ACTIVITIY SCORE: 24
MOBILITY SCORE: 20

## 2024-02-07 ASSESSMENT — PAIN SCALES - GENERAL
PAINLEVEL_OUTOF10: 0 - NO PAIN
PAINLEVEL_OUTOF10: 7

## 2024-02-07 ASSESSMENT — PAIN - FUNCTIONAL ASSESSMENT
PAIN_FUNCTIONAL_ASSESSMENT: 0-10

## 2024-02-07 ASSESSMENT — PAIN DESCRIPTION - LOCATION: LOCATION: THROAT

## 2024-02-07 ASSESSMENT — PAIN SCALES - WONG BAKER: WONGBAKER_NUMERICALRESPONSE: HURTS LITTLE BIT

## 2024-02-07 NOTE — PROGRESS NOTES
General Surgery Progress Note    Patient: Linda Rankin  Unit/Bed: 620/620-A  YOB: 1975  MRN: [id number]  Acct: [id number]   Admitting Diagnosis: Ileus (CMS/HCC) [K56.7]  Small bowel obstruction (CMS/HCC) [K56.609]  Date:  2/4/2024  Hospital Day: 2  Attending: Abdirahman Singh MD    Complaint:  Chief Complaint   Patient presents with    Abdominal Pain     Lower since Friday.  Says when she applies ice packs  it has helped      Subjective  Patient seen and examined this morning. No acute events overnight.  She states she feels a little better but is still having abdominal pain.  She denies nausea or vomiting.  She has not had a bowel movement but has had minimal flatus.     PHYSICAL EXAM:  Physical Exam  Constitutional:       General: She is not in acute distress.  HENT:      Head: Normocephalic and atraumatic.      Mouth/Throat:      Mouth: Mucous membranes are moist.   Eyes:      Conjunctiva/sclera: Conjunctivae normal.   Pulmonary:      Effort: Pulmonary effort is normal. No respiratory distress.   Abdominal:      Palpations: Abdomen is soft.      Tenderness: There is abdominal tenderness. There is no guarding.      Comments: Approximately 400 mL dark brown output from NG tube in canister.   Musculoskeletal:         General: No swelling.   Skin:     General: Skin is warm and dry.   Neurological:      Mental Status: She is alert.   Psychiatric:         Mood and Affect: Mood normal.         Behavior: Behavior normal.       Vital signs in last 24 hours:  Vitals:    02/07/24 1512   BP: 132/78   Pulse: 82   Resp:    Temp: 37.2 °C (99 °F)   SpO2: 98%     Intake/Output this shift:    Intake/Output Summary (Last 24 hours) at 2/7/2024 1517  Last data filed at 2/7/2024 0158  Gross per 24 hour   Intake 1605 ml   Output 150 ml   Net 1455 ml        Allergies:  Allergies   Allergen Reactions    Amlodipine Unknown     Other reaction(s): Other: See Comments   edema    edema      Medications:  Scheduled  medications  enoxaparin, 40 mg, subcutaneous, q24h  pantoprazole, 40 mg, oral, Daily   Or  pantoprazole, 40 mg, intravenous, Daily      Continuous medications  sodium chloride 0.9%, 100 mL/hr, Last Rate: 100 mL/hr (02/07/24 0153)      PRN medications  PRN medications: acetaminophen **OR** acetaminophen **OR** acetaminophen, ketorolac, morphine, ondansetron **OR** ondansetron  Labs:  Results for orders placed or performed during the hospital encounter of 02/04/24 (from the past 24 hour(s))   CBC   Result Value Ref Range    WBC 6.0 4.4 - 11.3 x10*3/uL    nRBC 0.0 0.0 - 0.0 /100 WBCs    RBC 4.05 4.00 - 5.20 x10*6/uL    Hemoglobin 12.2 12.0 - 16.0 g/dL    Hematocrit 35.6 (L) 36.0 - 46.0 %    MCV 88 80 - 100 fL    MCH 30.1 26.0 - 34.0 pg    MCHC 34.3 32.0 - 36.0 g/dL    RDW 11.6 11.5 - 14.5 %    Platelets 191 150 - 450 x10*3/uL   Basic Metabolic Panel   Result Value Ref Range    Glucose 69 (L) 74 - 99 mg/dL    Sodium 138 136 - 145 mmol/L    Potassium 3.7 3.5 - 5.3 mmol/L    Chloride 105 98 - 107 mmol/L    Bicarbonate 24 21 - 32 mmol/L    Anion Gap 13 10 - 20 mmol/L    Urea Nitrogen 7 6 - 23 mg/dL    Creatinine 0.42 (L) 0.50 - 1.05 mg/dL    eGFR >90 >60 mL/min/1.73m*2    Calcium 8.5 (L) 8.6 - 10.3 mg/dL   Magnesium   Result Value Ref Range    Magnesium 1.65 1.60 - 2.40 mg/dL   SST TOP   Result Value Ref Range    Extra Tube Hold for add-ons.       Imaging:  XR abdomen 1 view    Result Date: 2/5/2024  Interpreted By:  Beth Erwin, STUDY: XR ABDOMEN 1 VIEW;  2/5/2024 2:24 am   INDICATION: Signs/Symptoms:NG placement.   COMPARISON: CT abdomen and pelvis 02/04/2024.   ACCESSION NUMBER(S): [id number]   ORDERING CLINICIAN: SEB GAUTHIER   FINDINGS: AP portable view of the abdomen was obtained.   An enteric tube terminates at the mid upper abdomen with the side hole in the region of the GE junction.   There is gaseous distention of the visualized bowel loops.   Surgical clips are noted at the right upper quadrant.   No  consolidation or pleural effusion at the visualized lungs.   Multilevel degenerative changes at the spine.       1. Enteric tube terminates at the mid upper abdomen with the side hole in the region of the GE junction, advancement by approximately 5-7 cm recommended. 2. Gaseous distention of the visualized bowel loops, better evaluated on prior CT abdomen and pelvis.   MACRO: None   Signed by: Beth Erwin 2/5/2024 2:45 AM Dictation workstation:   [id number]    CT abdomen pelvis w IV contrast    Result Date: 2/5/2024  Interpreted By:  Lis Valdivia, STUDY: CT ABDOMEN PELVIS W IV CONTRAST;  2/4/2024 10:19 pm   INDICATION: Signs/Symptoms:Abdominal pain.   COMPARISON: 05/07/2018   ACCESSION NUMBER(S): [id number]   ORDERING CLINICIAN: SEB GAUTHIER   TECHNIQUE: Axial CT images of the abdomen and pelvis with coronal and sagittal reconstructed images obtained after intravenous administration of contrast   FINDINGS: LOWER CHEST: Bibasilar-dependent atelectasis. Small hiatal hernia. BONES: No acute osseous abnormality. Mild diffuse degenerative disc changes. Mild bilateral hip osteoarthrosis. ABDOMINAL WALL: Small fat containing umbilical hernia.   ABDOMEN:   LIVER: Subcentimeter hypodense lesion in the dome of the liver is too small to characterize, but statistically likely a small cyst. BILE DUCTS: No biliary dilatation. GALLBLADDER: Cholecystectomy. PANCREAS: Within normal limits. SPLEEN: Within normal limits. ADRENALS: Within normal limits. KIDNEYS and URETERS: Symmetric renal enhancement. No hydronephrosis or perinephric fluid collection.     VESSELS: No aortic aneurysm. RETROPERITONEUM: No pathologically enlarged retroperitoneal lymph nodes.   PELVIS:   REPRODUCTIVE ORGANS: The uterus and ovaries appear unremarkable. Tubal ligation clips are noted. BLADDER: Within normal limits.   BOWEL: Sleeve gastrectomy. The stomach is nondistended. There are multiple loops of dilated proximal small bowel. No discrete  transition point, however, there is gradual narrowing of the bowel in the right mid abdomen, with associated mild wall thickening. The distal small bowel is decompressed. Normal appendix. PERITONEUM: There is a moderate amount of simple free pelvic fluid/ascites. No pneumoperitoneum.       Multiple dilated loops of small bowel with gradual transitional narrowing associated with mild the thickened bowel loops. Findings are suspicious for enteritis with ileus or partial obstruction.   Mild ascites.   Additional chronic findings as above.       MACRO: None   Signed by: Lis Valdivia 2/5/2024 12:39 AM Dictation workstation:   [id number]     Assessment  No acute events overnight.  Patient feels a little better but is still having abdominal pain.  She denies nausea or vomiting.  She has not had a bowel movement and has passed minimal gas.  Abdomen is soft but tender.  WBC normal. Having minimal output from NG tube and it is not continuously working so will order SBFT gastrografin.      Plan  -NPO with ice chips  -NG tube to low intermittent wall suction. Awaiting return of bowel function  -IVF  -Pain control  -Nausea control  -DVT prophylaxis- Lovenox 40mg   -Encourage ambulation  -Will order SBFT with [facility]astro[facility]     Further recommendations per Dr. Samantha Alfred PA-C  3:20 PM  02/07/24      Time spent  40  minutes obtaining labs, imaging, recommendations, interview, assessment, examination, medication review/ordering, and EMR review.    Plan of care was discussed extensively with patient. Patient verbalized understanding through teach back method. All questions and concerns addressed upon examination.     Of note, this documentation is completed using the Dragon Dictation system (voice recognition software). There may be spelling and/or grammatical errors that were not corrected prior to final submission.       With note above except were noted below    No acute events overnight, patient has still minimal  bilious output and minimal flatus out.  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  Patient has not really progressed over the last 24 hours.  Given her small amount of NG tube output but limited flatus, I believe the next best option is to do a small bowel follow-through just to ensure that the patient does not have persistent disease and we can progress to removing NG tube and giving her a liquid diet  -SBFT  -NPO, IVF, NGT  -OOB, Scds, ISS, dvt proph  -Cont. IP care    Abdirahman Singh MD  02/07/24  3:31 PM

## 2024-02-07 NOTE — CARE PLAN
Problem: Pain - Adult  Goal: Verbalizes/displays adequate comfort level or baseline comfort level  Outcome: Progressing     Problem: Safety - Adult  Goal: Free from fall injury  Outcome: Progressing     Problem: Discharge Planning  Goal: Discharge to home or other facility with appropriate resources  Outcome: Progressing     Problem: Chronic Conditions and Co-morbidities  Goal: Patient's chronic conditions and co-morbidity symptoms are monitored and maintained or improved  Outcome: Progressing     Problem: Pain  Goal: Takes deep breaths with improved pain control throughout the shift  Outcome: Progressing  Goal: Turns in bed with improved pain control throughout the shift  Outcome: Progressing  Goal: Walks with improved pain control throughout the shift  Outcome: Progressing  Goal: Performs ADL's with improved pain control throughout shift  Outcome: Progressing  Goal: Participates in PT with improved pain control throughout the shift  Outcome: Progressing  Goal: Free from opioid side effects throughout the shift  Outcome: Progressing  Goal: Free from acute confusion related to pain meds throughout the shift  Outcome: Progressing   The patient's goals for the shift include no pain    The clinical goals for the shift include patient will remain hemodynamically stable

## 2024-02-08 ENCOUNTER — APPOINTMENT (OUTPATIENT)
Dept: RADIOLOGY | Facility: HOSPITAL | Age: 49
DRG: 390 | End: 2024-02-08
Payer: COMMERCIAL

## 2024-02-08 LAB
ANION GAP SERPL CALC-SCNC: 15 MMOL/L (ref 10–20)
BUN SERPL-MCNC: 6 MG/DL (ref 6–23)
CALCIUM SERPL-MCNC: 8.3 MG/DL (ref 8.6–10.3)
CHLORIDE SERPL-SCNC: 107 MMOL/L (ref 98–107)
CO2 SERPL-SCNC: 20 MMOL/L (ref 21–32)
CREAT SERPL-MCNC: 0.44 MG/DL (ref 0.5–1.05)
EGFRCR SERPLBLD CKD-EPI 2021: >90 ML/MIN/1.73M*2
ERYTHROCYTE [DISTWIDTH] IN BLOOD BY AUTOMATED COUNT: 11.4 % (ref 11.5–14.5)
GLUCOSE SERPL-MCNC: 76 MG/DL (ref 74–99)
HCT VFR BLD AUTO: 34.8 % (ref 36–46)
HGB BLD-MCNC: 12.2 G/DL (ref 12–16)
HOLD SPECIMEN: NORMAL
MCH RBC QN AUTO: 30.1 PG (ref 26–34)
MCHC RBC AUTO-ENTMCNC: 35.1 G/DL (ref 32–36)
MCV RBC AUTO: 86 FL (ref 80–100)
NRBC BLD-RTO: 0 /100 WBCS (ref 0–0)
PLATELET # BLD AUTO: 220 X10*3/UL (ref 150–450)
POTASSIUM SERPL-SCNC: 3.6 MMOL/L (ref 3.5–5.3)
RBC # BLD AUTO: 4.05 X10*6/UL (ref 4–5.2)
SODIUM SERPL-SCNC: 138 MMOL/L (ref 136–145)
WBC # BLD AUTO: 6.7 X10*3/UL (ref 4.4–11.3)

## 2024-02-08 PROCEDURE — 99231 SBSQ HOSP IP/OBS SF/LOW 25: CPT | Performed by: SURGERY

## 2024-02-08 PROCEDURE — 74250 X-RAY XM SM INT 1CNTRST STD: CPT

## 2024-02-08 PROCEDURE — 2550000001 HC RX 255 CONTRASTS

## 2024-02-08 PROCEDURE — 1100000001 HC PRIVATE ROOM DAILY

## 2024-02-08 PROCEDURE — C9113 INJ PANTOPRAZOLE SODIUM, VIA: HCPCS | Performed by: NURSE PRACTITIONER

## 2024-02-08 PROCEDURE — 2500000004 HC RX 250 GENERAL PHARMACY W/ HCPCS (ALT 636 FOR OP/ED): Performed by: INTERNAL MEDICINE

## 2024-02-08 PROCEDURE — 99232 SBSQ HOSP IP/OBS MODERATE 35: CPT

## 2024-02-08 PROCEDURE — 80048 BASIC METABOLIC PNL TOTAL CA: CPT

## 2024-02-08 PROCEDURE — 74250 X-RAY XM SM INT 1CNTRST STD: CPT | Performed by: RADIOLOGY

## 2024-02-08 PROCEDURE — 2500000004 HC RX 250 GENERAL PHARMACY W/ HCPCS (ALT 636 FOR OP/ED): Performed by: STUDENT IN AN ORGANIZED HEALTH CARE EDUCATION/TRAINING PROGRAM

## 2024-02-08 PROCEDURE — 2500000004 HC RX 250 GENERAL PHARMACY W/ HCPCS (ALT 636 FOR OP/ED): Performed by: NURSE PRACTITIONER

## 2024-02-08 PROCEDURE — 85027 COMPLETE CBC AUTOMATED: CPT

## 2024-02-08 PROCEDURE — 36415 COLL VENOUS BLD VENIPUNCTURE: CPT

## 2024-02-08 RX ADMIN — SODIUM CHLORIDE 100 ML/HR: 9 INJECTION, SOLUTION INTRAVENOUS at 11:34

## 2024-02-08 RX ADMIN — DIATRIZOATE MEGLUMINE AND DIATRIZOATE SODIUM 60 ML: 660; 100 LIQUID ORAL; RECTAL at 09:40

## 2024-02-08 RX ADMIN — KETOROLAC TROMETHAMINE 30 MG: 30 INJECTION, SOLUTION INTRAMUSCULAR; INTRAVENOUS at 11:23

## 2024-02-08 RX ADMIN — PANTOPRAZOLE SODIUM 40 MG: 40 INJECTION, POWDER, FOR SOLUTION INTRAVENOUS at 05:17

## 2024-02-08 RX ADMIN — KETOROLAC TROMETHAMINE 30 MG: 30 INJECTION, SOLUTION INTRAMUSCULAR; INTRAVENOUS at 03:00

## 2024-02-08 RX ADMIN — DIATRIZOATE MEGLUMINE AND DIATRIZOATE SODIUM 60 ML: 660; 100 LIQUID ORAL; RECTAL at 09:25

## 2024-02-08 RX ADMIN — DIATRIZOATE MEGLUMINE AND DIATRIZOATE SODIUM 60 ML: 660; 100 LIQUID ORAL; RECTAL at 09:55

## 2024-02-08 RX ADMIN — MORPHINE SULFATE 2 MG: 2 INJECTION, SOLUTION INTRAMUSCULAR; INTRAVENOUS at 05:32

## 2024-02-08 RX ADMIN — ENOXAPARIN SODIUM 40 MG: 40 INJECTION SUBCUTANEOUS at 16:02

## 2024-02-08 ASSESSMENT — COGNITIVE AND FUNCTIONAL STATUS - GENERAL
MOBILITY SCORE: 24
MOBILITY SCORE: 24
DAILY ACTIVITIY SCORE: 24
DAILY ACTIVITIY SCORE: 24

## 2024-02-08 ASSESSMENT — PAIN SCALES - GENERAL
PAINLEVEL_OUTOF10: 8
PAINLEVEL_OUTOF10: 5 - MODERATE PAIN
PAINLEVEL_OUTOF10: 1
PAINLEVEL_OUTOF10: 6
PAINLEVEL_OUTOF10: 1

## 2024-02-08 ASSESSMENT — PAIN SCALES - WONG BAKER
WONGBAKER_NUMERICALRESPONSE: NO HURT
WONGBAKER_NUMERICALRESPONSE: HURTS LITTLE BIT

## 2024-02-08 ASSESSMENT — PAIN - FUNCTIONAL ASSESSMENT
PAIN_FUNCTIONAL_ASSESSMENT: 0-10

## 2024-02-08 ASSESSMENT — PAIN SCALES - PAIN ASSESSMENT IN ADVANCED DEMENTIA (PAINAD)
FACIALEXPRESSION: SMILING OR INEXPRESSIVE
BODYLANGUAGE: RELAXED
FACIALEXPRESSION: SMILING OR INEXPRESSIVE
TOTALSCORE: 0
BREATHING: NORMAL
BODYLANGUAGE: RELAXED
BREATHING: NORMAL
CONSOLABILITY: NO NEED TO CONSOLE
TOTALSCORE: 0
CONSOLABILITY: NO NEED TO CONSOLE

## 2024-02-08 ASSESSMENT — PAIN DESCRIPTION - LOCATION
LOCATION: NECK
LOCATION: NECK
LOCATION: THROAT

## 2024-02-08 ASSESSMENT — PAIN DESCRIPTION - ORIENTATION
ORIENTATION: LEFT
ORIENTATION: LEFT

## 2024-02-08 NOTE — PROGRESS NOTES
General Surgery Progress Note    Patient: Linda Rankin  Unit/Bed: 620/620-A  YOB: 1975  MRN: [id number]  Acct: [id number]   Admitting Diagnosis: Ileus (CMS/HCC) [K56.7]  Small bowel obstruction (CMS/HCC) [K56.609]  Date:  2/4/2024  Hospital Day: 3  Attending: Abdirahman Singh MD    Complaint:  Chief Complaint   Patient presents with    Abdominal Pain     Lower since Friday.  Says when she applies ice packs  it has helped      Subjective  Patient seen and examined this morning. No acute events overnight.  She states she feels a little better but is still having abdominal pain.  She denies nausea or vomiting.  She has not had a bowel movement but has had flatus.     PHYSICAL EXAM:  Physical Exam  Vitals and nursing note reviewed.   Constitutional:       General: She is not in acute distress.     Appearance: Normal appearance. She is not toxic-appearing.   HENT:      Head: Normocephalic and atraumatic.      Right Ear: External ear normal.      Left Ear: External ear normal.      Nose: Nose normal.      Comments: NGT     Mouth/Throat:      Mouth: Mucous membranes are moist.      Pharynx: Oropharynx is clear.   Eyes:      Extraocular Movements: Extraocular movements intact.      Conjunctiva/sclera: Conjunctivae normal.      Pupils: Pupils are equal, round, and reactive to light.   Cardiovascular:      Rate and Rhythm: Normal rate and regular rhythm.      Pulses: Normal pulses.      Heart sounds: Normal heart sounds.   Pulmonary:      Effort: Pulmonary effort is normal. No respiratory distress.      Breath sounds: Normal breath sounds.   Abdominal:      General: Abdomen is flat. Bowel sounds are normal.      Palpations: Abdomen is soft.      Tenderness: There is abdominal tenderness. There is no guarding.   Musculoskeletal:         General: No swelling. Normal range of motion.      Cervical back: Normal range of motion and neck supple.   Skin:     General: Skin is warm and dry.      Capillary Refill:  Capillary refill takes less than 2 seconds.   Neurological:      General: No focal deficit present.      Mental Status: She is alert and oriented to person, place, and time. Mental status is at baseline.   Psychiatric:         Mood and Affect: Mood normal.         Behavior: Behavior normal.       Vital signs in last 24 hours:  Vitals:    02/08/24 0748   BP: 142/82   Pulse: 78   Resp: 18   Temp: 37 °C (98.6 °F)   SpO2: 94%     Intake/Output this shift:    Intake/Output Summary (Last 24 hours) at 2/8/2024 1311  Last data filed at 2/8/2024 1134  Gross per 24 hour   Intake 6739 ml   Output 950 ml   Net 5789 ml        Allergies:  Allergies   Allergen Reactions    Amlodipine Unknown     Other reaction(s): Other: See Comments   edema    edema      Medications:  Scheduled medications  enoxaparin, 40 mg, subcutaneous, q24h  pantoprazole, 40 mg, oral, Daily   Or  pantoprazole, 40 mg, intravenous, Daily      Continuous medications  sodium chloride 0.9%, 100 mL/hr, Last Rate: 100 mL/hr (02/08/24 1134)      PRN medications  PRN medications: acetaminophen **OR** acetaminophen **OR** acetaminophen, ketorolac, morphine, ondansetron **OR** ondansetron  Labs:  Results for orders placed or performed during the hospital encounter of 02/04/24 (from the past 24 hour(s))   POCT GLUCOSE   Result Value Ref Range    POCT Glucose 68 (L) 74 - 99 mg/dL   SST TOP   Result Value Ref Range    Extra Tube Hold for add-ons.    CBC   Result Value Ref Range    WBC 6.7 4.4 - 11.3 x10*3/uL    nRBC 0.0 0.0 - 0.0 /100 WBCs    RBC 4.05 4.00 - 5.20 x10*6/uL    Hemoglobin 12.2 12.0 - 16.0 g/dL    Hematocrit 34.8 (L) 36.0 - 46.0 %    MCV 86 80 - 100 fL    MCH 30.1 26.0 - 34.0 pg    MCHC 35.1 32.0 - 36.0 g/dL    RDW 11.4 (L) 11.5 - 14.5 %    Platelets 220 150 - 450 x10*3/uL   Basic metabolic panel   Result Value Ref Range    Glucose 76 74 - 99 mg/dL    Sodium 138 136 - 145 mmol/L    Potassium 3.6 3.5 - 5.3 mmol/L    Chloride 107 98 - 107 mmol/L    Bicarbonate  20 (L) 21 - 32 mmol/L    Anion Gap 15 10 - 20 mmol/L    Urea Nitrogen 6 6 - 23 mg/dL    Creatinine 0.44 (L) 0.50 - 1.05 mg/dL    eGFR >90 >60 mL/min/1.73m*2    Calcium 8.3 (L) 8.6 - 10.3 mg/dL      Imaging:  XR abdomen 1 view    Result Date: 2/5/2024  Interpreted By:  Beth Erwin, STUDY: XR ABDOMEN 1 VIEW;  2/5/2024 2:24 am   INDICATION: Signs/Symptoms:NG placement.   COMPARISON: CT abdomen and pelvis 02/04/2024.   ACCESSION NUMBER(S): [id number]   ORDERING CLINICIAN: SEB GAUTHIER   FINDINGS: AP portable view of the abdomen was obtained.   An enteric tube terminates at the mid upper abdomen with the side hole in the region of the GE junction.   There is gaseous distention of the visualized bowel loops.   Surgical clips are noted at the right upper quadrant.   No consolidation or pleural effusion at the visualized lungs.   Multilevel degenerative changes at the spine.       1. Enteric tube terminates at the mid upper abdomen with the side hole in the region of the GE junction, advancement by approximately 5-7 cm recommended. 2. Gaseous distention of the visualized bowel loops, better evaluated on prior CT abdomen and pelvis.   MACRO: None   Signed by: Beth Erwin 2/5/2024 2:45 AM Dictation workstation:   [id number]    CT abdomen pelvis w IV contrast    Result Date: 2/5/2024  Interpreted By:  Lis Valdivia, STUDY: CT ABDOMEN PELVIS W IV CONTRAST;  2/4/2024 10:19 pm   INDICATION: Signs/Symptoms:Abdominal pain.   COMPARISON: 05/07/2018   ACCESSION NUMBER(S): [id number]   ORDERING CLINICIAN: SEB GAUTHIER   TECHNIQUE: Axial CT images of the abdomen and pelvis with coronal and sagittal reconstructed images obtained after intravenous administration of contrast   FINDINGS: LOWER CHEST: Bibasilar-dependent atelectasis. Small hiatal hernia. BONES: No acute osseous abnormality. Mild diffuse degenerative disc changes. Mild bilateral hip osteoarthrosis. ABDOMINAL WALL: Small fat containing umbilical hernia.    ABDOMEN:   LIVER: Subcentimeter hypodense lesion in the dome of the liver is too small to characterize, but statistically likely a small cyst. BILE DUCTS: No biliary dilatation. GALLBLADDER: Cholecystectomy. PANCREAS: Within normal limits. SPLEEN: Within normal limits. ADRENALS: Within normal limits. KIDNEYS and URETERS: Symmetric renal enhancement. No hydronephrosis or perinephric fluid collection.     VESSELS: No aortic aneurysm. RETROPERITONEUM: No pathologically enlarged retroperitoneal lymph nodes.   PELVIS:   REPRODUCTIVE ORGANS: The uterus and ovaries appear unremarkable. Tubal ligation clips are noted. BLADDER: Within normal limits.   BOWEL: Sleeve gastrectomy. The stomach is nondistended. There are multiple loops of dilated proximal small bowel. No discrete transition point, however, there is gradual narrowing of the bowel in the right mid abdomen, with associated mild wall thickening. The distal small bowel is decompressed. Normal appendix. PERITONEUM: There is a moderate amount of simple free pelvic fluid/ascites. No pneumoperitoneum.       Multiple dilated loops of small bowel with gradual transitional narrowing associated with mild the thickened bowel loops. Findings are suspicious for enteritis with ileus or partial obstruction.   Mild ascites.   Additional chronic findings as above.       MACRO: None   Signed by: Lis Valdivia 2/5/2024 12:39 AM Dictation workstation:   [id number]     Assessment  No acute events overnight.  Patient feels a little better but is still having abdominal pain.  She denies nausea or vomiting.  She has not had a bowel movement and has passed gas.  Abdomen is soft but tender.  WBC normal at 6.7 today.  Still having minimal output from NG tube, small bowel follow-through from this morning showed no evidence of a small bowel obstruction.    Plan  -Clear liquid diet as tolerated, if patient becomes nauseous or vomits she will have to be strict NPO  -NG tube removed  -Pain  control  -Nausea control  -DVT prophylaxis- Lovenox 40mg   -Encourage ambulation    Further recommendations per Dr. Samantha Alfred PA-C  1:11 PM  02/08/24      Time spent  40  minutes obtaining labs, imaging, recommendations, interview, assessment, examination, medication review/ordering, and EMR review.    Plan of care was discussed extensively with patient. Patient verbalized understanding through teach back method. All questions and concerns addressed upon examination.     Of note, this documentation is completed using the Dragon Dictation system (voice recognition software). There may be spelling and/or grammatical errors that were not corrected prior to final submission.     Agree with note above except were noted below    Minimal out of NG tube overnight, still passing flatus.  Had small bowel follow-through this morning which patient threw up but still had results done confirming that she had no obstruction with contrast in the colon.  Also had bowel movement after test done.    On physical exam the patient has no acute distress.  Alert and aware x 3, abdomen soft, nontender to palpation    48-year-old female with past surgical history significant for sleeve gastrectomy who presented with small bowel obstruction, appears to be resolving  -NG tube discontinued  -Clear liquid diet  -Continue IV fluids for now  -OOB, SCDs, ISS, DVT proph    Abdirahman Singh MD  02/08/24  1:19 PM

## 2024-02-08 NOTE — CARE PLAN
Problem: Pain - Adult  Goal: Verbalizes/displays adequate comfort level or baseline comfort level  Outcome: Progressing     Problem: Safety - Adult  Goal: Free from fall injury  Outcome: Progressing     Problem: Discharge Planning  Goal: Discharge to home or other facility with appropriate resources  Outcome: Progressing     Problem: Chronic Conditions and Co-morbidities  Goal: Patient's chronic conditions and co-morbidity symptoms are monitored and maintained or improved  Outcome: Progressing     Problem: Pain  Goal: Takes deep breaths with improved pain control throughout the shift  Outcome: Progressing  Goal: Turns in bed with improved pain control throughout the shift  Outcome: Progressing  Goal: Walks with improved pain control throughout the shift  Outcome: Progressing  Goal: Performs ADL's with improved pain control throughout shift  Outcome: Progressing  Goal: Participates in PT with improved pain control throughout the shift  Outcome: Progressing  Goal: Free from opioid side effects throughout the shift  Outcome: Progressing  Goal: Free from acute confusion related to pain meds throughout the shift  Outcome: Progressing   he patient's goals for the shift include no pain    The clinical goals for the shift include patient will remain hemodynamically stable    Over the shift, the patient did not make progress toward the following goals. Barriers to progression include . Recommendations to address these barriers include

## 2024-02-08 NOTE — CARE PLAN
Problem: Pain - Adult  Goal: Verbalizes/displays adequate comfort level or baseline comfort level  Outcome: Progressing     Problem: Safety - Adult  Goal: Free from fall injury  Outcome: Met     Problem: Discharge Planning  Goal: Discharge to home or other facility with appropriate resources  Outcome: Progressing     Problem: Chronic Conditions and Co-morbidities  Goal: Patient's chronic conditions and co-morbidity symptoms are monitored and maintained or improved  Outcome: Progressing     Problem: Pain  Goal: Takes deep breaths with improved pain control throughout the shift  Outcome: Met  Goal: Turns in bed with improved pain control throughout the shift  Outcome: Met  Goal: Walks with improved pain control throughout the shift  Outcome: Met  Goal: Performs ADL's with improved pain control throughout shift  Outcome: Met  Goal: Free from opioid side effects throughout the shift  Outcome: Met  Goal: Free from acute confusion related to pain meds throughout the shift  Outcome: Met   The patient's goals for the shift include Remove NG    The clinical goals for the shift include stable GI assessment

## 2024-02-09 LAB
ANION GAP SERPL CALC-SCNC: 9 MMOL/L (ref 10–20)
BUN SERPL-MCNC: 4 MG/DL (ref 6–23)
CALCIUM SERPL-MCNC: 8.2 MG/DL (ref 8.6–10.3)
CHLORIDE SERPL-SCNC: 109 MMOL/L (ref 98–107)
CO2 SERPL-SCNC: 24 MMOL/L (ref 21–32)
CREAT SERPL-MCNC: 0.45 MG/DL (ref 0.5–1.05)
EGFRCR SERPLBLD CKD-EPI 2021: >90 ML/MIN/1.73M*2
ERYTHROCYTE [DISTWIDTH] IN BLOOD BY AUTOMATED COUNT: 11.8 % (ref 11.5–14.5)
GLUCOSE SERPL-MCNC: 111 MG/DL (ref 74–99)
HCT VFR BLD AUTO: 37.7 % (ref 36–46)
HGB BLD-MCNC: 13.3 G/DL (ref 12–16)
HOLD SPECIMEN: NORMAL
MAGNESIUM SERPL-MCNC: 1.44 MG/DL (ref 1.6–2.4)
MCH RBC QN AUTO: 30 PG (ref 26–34)
MCHC RBC AUTO-ENTMCNC: 35.3 G/DL (ref 32–36)
MCV RBC AUTO: 85 FL (ref 80–100)
NRBC BLD-RTO: 0 /100 WBCS (ref 0–0)
PLATELET # BLD AUTO: 246 X10*3/UL (ref 150–450)
POTASSIUM SERPL-SCNC: 3.3 MMOL/L (ref 3.5–5.3)
RBC # BLD AUTO: 4.44 X10*6/UL (ref 4–5.2)
SODIUM SERPL-SCNC: 139 MMOL/L (ref 136–145)
WBC # BLD AUTO: 6.7 X10*3/UL (ref 4.4–11.3)

## 2024-02-09 PROCEDURE — 85027 COMPLETE CBC AUTOMATED: CPT

## 2024-02-09 PROCEDURE — 1100000001 HC PRIVATE ROOM DAILY

## 2024-02-09 PROCEDURE — 2500000004 HC RX 250 GENERAL PHARMACY W/ HCPCS (ALT 636 FOR OP/ED): Performed by: NURSE PRACTITIONER

## 2024-02-09 PROCEDURE — 99232 SBSQ HOSP IP/OBS MODERATE 35: CPT

## 2024-02-09 PROCEDURE — 2500000004 HC RX 250 GENERAL PHARMACY W/ HCPCS (ALT 636 FOR OP/ED): Performed by: STUDENT IN AN ORGANIZED HEALTH CARE EDUCATION/TRAINING PROGRAM

## 2024-02-09 PROCEDURE — 80048 BASIC METABOLIC PNL TOTAL CA: CPT

## 2024-02-09 PROCEDURE — 2500000004 HC RX 250 GENERAL PHARMACY W/ HCPCS (ALT 636 FOR OP/ED)

## 2024-02-09 PROCEDURE — 99231 SBSQ HOSP IP/OBS SF/LOW 25: CPT | Performed by: SURGERY

## 2024-02-09 PROCEDURE — 83735 ASSAY OF MAGNESIUM: CPT

## 2024-02-09 PROCEDURE — 36415 COLL VENOUS BLD VENIPUNCTURE: CPT

## 2024-02-09 PROCEDURE — 2500000004 HC RX 250 GENERAL PHARMACY W/ HCPCS (ALT 636 FOR OP/ED): Performed by: INTERNAL MEDICINE

## 2024-02-09 RX ORDER — POTASSIUM CHLORIDE 20 MEQ/1
40 TABLET, EXTENDED RELEASE ORAL ONCE
Status: COMPLETED | OUTPATIENT
Start: 2024-02-09 | End: 2024-02-09

## 2024-02-09 RX ORDER — MAGNESIUM SULFATE HEPTAHYDRATE 40 MG/ML
2 INJECTION, SOLUTION INTRAVENOUS ONCE
Status: COMPLETED | OUTPATIENT
Start: 2024-02-09 | End: 2024-02-09

## 2024-02-09 RX ADMIN — SODIUM CHLORIDE 100 ML/HR: 9 INJECTION, SOLUTION INTRAVENOUS at 06:34

## 2024-02-09 RX ADMIN — KETOROLAC TROMETHAMINE 30 MG: 30 INJECTION, SOLUTION INTRAMUSCULAR; INTRAVENOUS at 03:29

## 2024-02-09 RX ADMIN — ENOXAPARIN SODIUM 40 MG: 40 INJECTION SUBCUTANEOUS at 17:12

## 2024-02-09 RX ADMIN — PANTOPRAZOLE SODIUM 40 MG: 40 TABLET, DELAYED RELEASE ORAL at 05:24

## 2024-02-09 RX ADMIN — POTASSIUM CHLORIDE 40 MEQ: 1500 TABLET, EXTENDED RELEASE ORAL at 09:18

## 2024-02-09 RX ADMIN — MAGNESIUM SULFATE HEPTAHYDRATE 2 G: 40 INJECTION, SOLUTION INTRAVENOUS at 09:18

## 2024-02-09 ASSESSMENT — COGNITIVE AND FUNCTIONAL STATUS - GENERAL
MOBILITY SCORE: 24
DAILY ACTIVITIY SCORE: 24

## 2024-02-09 ASSESSMENT — PAIN SCALES - GENERAL
PAINLEVEL_OUTOF10: 3
PAINLEVEL_OUTOF10: 6
PAINLEVEL_OUTOF10: 0 - NO PAIN

## 2024-02-09 ASSESSMENT — PAIN - FUNCTIONAL ASSESSMENT
PAIN_FUNCTIONAL_ASSESSMENT: 0-10
PAIN_FUNCTIONAL_ASSESSMENT: 0-10

## 2024-02-09 NOTE — CARE PLAN
Problem: Pain - Adult  Goal: Verbalizes/displays adequate comfort level or baseline comfort level  Outcome: Met     Problem: Discharge Planning  Goal: Discharge to home or other facility with appropriate resources  Outcome: Met     Problem: Chronic Conditions and Co-morbidities  Goal: Patient's chronic conditions and co-morbidity symptoms are monitored and maintained or improved  Outcome: Progressing     Problem: Pain  Goal: Participates in PT with improved pain control throughout the shift  Outcome: Met   The patient's goals for the shift include Remove NG    The clinical goals for the shift include Patient will tolerate clear liquids

## 2024-02-09 NOTE — PROGRESS NOTES
General Surgery Progress Note    Patient: Linda Rankin  Unit/Bed: 620/620-A  YOB: 1975  MRN: [id number]  Acct: [id number]   Admitting Diagnosis: Ileus (CMS/HCC) [K56.7]  Small bowel obstruction (CMS/HCC) [K56.609]  Date:  2/4/2024  Hospital Day: 4  Attending: Abdirahman Singh MD    Complaint:  Chief Complaint   Patient presents with    Abdominal Pain     Lower since Friday.  Says when she applies ice packs  it has helped      Subjective  Patient seen and examined this morning. No acute events overnight.  She states she feels alright and her abdominal pain has improved.  She has been passing gas and has had diarrhea.  She is tolerating the clear liquid diet but thinks because there is so much sugar, it may be causing the diarrhea.  She has no other complaints.  She denies fevers, N/V, shortness of breath, leg pain or swelling, or any other symptoms.  PHYSICAL EXAM:  Physical Exam  Constitutional:       General: She is not in acute distress.  HENT:      Head: Normocephalic and atraumatic.      Mouth/Throat:      Mouth: Mucous membranes are moist.   Eyes:      Conjunctiva/sclera: Conjunctivae normal.   Cardiovascular:      Rate and Rhythm: Normal rate and regular rhythm.   Pulmonary:      Effort: Pulmonary effort is normal. No respiratory distress.      Breath sounds: Normal breath sounds.   Abdominal:      General: Abdomen is flat. There is no distension.      Palpations: Abdomen is soft.      Tenderness: There is no abdominal tenderness. There is no guarding.   Musculoskeletal:         General: No swelling.   Skin:     General: Skin is warm and dry.   Neurological:      Mental Status: She is alert.   Psychiatric:         Mood and Affect: Mood normal.         Behavior: Behavior normal.       Vital signs in last 24 hours:  Vitals:    02/09/24 0759   BP: 129/74   Pulse: 75   Resp: 16   Temp: 37 °C (98.6 °F)   SpO2: 94%     Intake/Output this shift:    Intake/Output Summary (Last 24 hours) at 2/9/2024  1044  Last data filed at 2/9/2024 0558  Gross per 24 hour   Intake 5311 ml   Output --   Net 5311 ml      Allergies:  Allergies   Allergen Reactions    Amlodipine Unknown     Other reaction(s): Other: See Comments   edema    edema      Medications:  Scheduled medications  enoxaparin, 40 mg, subcutaneous, q24h  magnesium sulfate, 2 g, intravenous, Once  pantoprazole, 40 mg, oral, Daily   Or  pantoprazole, 40 mg, intravenous, Daily      Continuous medications     PRN medications  PRN medications: acetaminophen **OR** acetaminophen **OR** acetaminophen, ketorolac, morphine, ondansetron **OR** ondansetron  Labs:  Results for orders placed or performed during the hospital encounter of 02/04/24 (from the past 24 hour(s))   Basic metabolic panel   Result Value Ref Range    Glucose 111 (H) 74 - 99 mg/dL    Sodium 139 136 - 145 mmol/L    Potassium 3.3 (L) 3.5 - 5.3 mmol/L    Chloride 109 (H) 98 - 107 mmol/L    Bicarbonate 24 21 - 32 mmol/L    Anion Gap 9 (L) 10 - 20 mmol/L    Urea Nitrogen 4 (L) 6 - 23 mg/dL    Creatinine 0.45 (L) 0.50 - 1.05 mg/dL    eGFR >90 >60 mL/min/1.73m*2    Calcium 8.2 (L) 8.6 - 10.3 mg/dL   CBC   Result Value Ref Range    WBC 6.7 4.4 - 11.3 x10*3/uL    nRBC 0.0 0.0 - 0.0 /100 WBCs    RBC 4.44 4.00 - 5.20 x10*6/uL    Hemoglobin 13.3 12.0 - 16.0 g/dL    Hematocrit 37.7 36.0 - 46.0 %    MCV 85 80 - 100 fL    MCH 30.0 26.0 - 34.0 pg    MCHC 35.3 32.0 - 36.0 g/dL    RDW 11.8 11.5 - 14.5 %    Platelets 246 150 - 450 x10*3/uL   Magnesium   Result Value Ref Range    Magnesium 1.44 (L) 1.60 - 2.40 mg/dL   SST TOP   Result Value Ref Range    Extra Tube Hold for add-ons.       Imaging:  FL small bowel series    Result Date: 2/8/2024  Interpreted By:  Sarmad Freeman, STUDY: FL SMALL BOWEL SERIES;  2/8/2024 10:42 am   INDICATION: Signs/Symptoms:SBO.   COMPARISON: CT abdomen and pelvis with contrast 4 February 2024   ACCESSION NUMBER(S): [id number]   ORDERING CLINICIAN: MICHELLE CURRY   TECHNIQUE: Water-soluble  small bowel series   FINDINGS:  view shows new enteric tube tip projecting over distal gastric body; persistent mild gaseous small bowel dilation in the mid abdomen   Water-soluble contrast passed out of the stomach into the proximal duodenum immediately, then into the jejunum within 15 minutes   The water-soluble contrast column cleared at least proximal one half of the jejunum by 30 minutes   Sometime between 30 and 60 minutes the oral contrast column reached through the entirety of the colon   Contrast-filled small bowel loops are mildly dilated with some fold thickening   No leak       No fixed mechanical small bowel obstruction   Persistent small bowel dilation with some fold thickening, could be seen with an enteritis   No leak     MACRO: None   Signed by: Sarmad Freeman 2/8/2024 11:07 AM Dictation workstation:   [id number]     Assessment  SBO.  No acute events overnight.  Abdominal pain is improved and she is passing gas.  She is having diarrhea.  She is tolerating the clear liquid diet.  Potassium was low at 3.3, magnesium was low at 1.44.    Plan  -Start regular diet  -Pain control  -Nausea control  -K and Mg were replaced  -IV fluids discontinued  -DVT prophylaxis- Lovenox 40mg, SCDs  -Encourage ambulation      Further recommendations per Dr. Samantha Waters PA-C    Time spent  40  minutes obtaining labs, imaging, recommendations, interview, assessment, examination, medication review/ordering, and EMR review.    Plan of care was discussed extensively with patient. Patient verbalized understanding through teach back method. All questions and concerns addressed upon examination.     Of note, this documentation is completed using the Dragon Dictation system (voice recognition software). There may be spelling and/or grammatical errors that were not corrected prior to final submission.     Agree with note above except were noted below    No acute event tonight, patient is passing flatus and having  bowel movement, pain is resolved, tolerating liquids.  No other issues    No acute distress on exam, alert and aware x 3, abdomen soft, nondistended, nontender to palpation    48-year-old female who presents with small bowel obstruction, now resolved.  -Okay for regular diet  -Saline lock  -OOB, SCDs, ISS, DvT proph    Abdirahman Singh MD  02/09/24  11:23 AM

## 2024-02-09 NOTE — PROGRESS NOTES
02/09/24 1351   Discharge Planning   Home or Post Acute Services None   Patient expects to be discharged to: home     Pt small bowel obstruction resolved, NG is discontinued, advancing diet. Pt DC preference remains home no needs. CT team continuing to monitor case progression for potential DC needs.

## 2024-02-10 VITALS
SYSTOLIC BLOOD PRESSURE: 130 MMHG | HEIGHT: 67 IN | BODY MASS INDEX: 32.42 KG/M2 | WEIGHT: 206.57 LBS | HEART RATE: 73 BPM | DIASTOLIC BLOOD PRESSURE: 69 MMHG | RESPIRATION RATE: 16 BRPM | OXYGEN SATURATION: 95 % | TEMPERATURE: 98.1 F

## 2024-02-10 LAB
ANION GAP SERPL CALC-SCNC: <7 MMOL/L (ref 10–20)
BUN SERPL-MCNC: 3 MG/DL (ref 6–23)
CALCIUM SERPL-MCNC: 8.4 MG/DL (ref 8.6–10.3)
CHLORIDE SERPL-SCNC: 111 MMOL/L (ref 98–107)
CO2 SERPL-SCNC: 25 MMOL/L (ref 21–32)
CREAT SERPL-MCNC: 0.49 MG/DL (ref 0.5–1.05)
EGFRCR SERPLBLD CKD-EPI 2021: >90 ML/MIN/1.73M*2
ERYTHROCYTE [DISTWIDTH] IN BLOOD BY AUTOMATED COUNT: 11.9 % (ref 11.5–14.5)
GLUCOSE SERPL-MCNC: 92 MG/DL (ref 74–99)
HCT VFR BLD AUTO: 35 % (ref 36–46)
HGB BLD-MCNC: 12.3 G/DL (ref 12–16)
HOLD SPECIMEN: NORMAL
MAGNESIUM SERPL-MCNC: 1.61 MG/DL (ref 1.6–2.4)
MCH RBC QN AUTO: 29.7 PG (ref 26–34)
MCHC RBC AUTO-ENTMCNC: 35.1 G/DL (ref 32–36)
MCV RBC AUTO: 85 FL (ref 80–100)
NRBC BLD-RTO: 0 /100 WBCS (ref 0–0)
PLATELET # BLD AUTO: 247 X10*3/UL (ref 150–450)
POTASSIUM SERPL-SCNC: 3.1 MMOL/L (ref 3.5–5.3)
RBC # BLD AUTO: 4.14 X10*6/UL (ref 4–5.2)
SODIUM SERPL-SCNC: 137 MMOL/L (ref 136–145)
WBC # BLD AUTO: 5.4 X10*3/UL (ref 4.4–11.3)

## 2024-02-10 PROCEDURE — 2500000002 HC RX 250 W HCPCS SELF ADMINISTERED DRUGS (ALT 637 FOR MEDICARE OP, ALT 636 FOR OP/ED): Performed by: NURSE PRACTITIONER

## 2024-02-10 PROCEDURE — 36415 COLL VENOUS BLD VENIPUNCTURE: CPT

## 2024-02-10 PROCEDURE — 2500000004 HC RX 250 GENERAL PHARMACY W/ HCPCS (ALT 636 FOR OP/ED): Performed by: STUDENT IN AN ORGANIZED HEALTH CARE EDUCATION/TRAINING PROGRAM

## 2024-02-10 PROCEDURE — 83735 ASSAY OF MAGNESIUM: CPT

## 2024-02-10 PROCEDURE — 85027 COMPLETE CBC AUTOMATED: CPT

## 2024-02-10 PROCEDURE — 80048 BASIC METABOLIC PNL TOTAL CA: CPT

## 2024-02-10 PROCEDURE — 99232 SBSQ HOSP IP/OBS MODERATE 35: CPT | Performed by: REGISTERED NURSE

## 2024-02-10 RX ADMIN — PANTOPRAZOLE SODIUM 40 MG: 40 TABLET, DELAYED RELEASE ORAL at 05:04

## 2024-02-10 RX ADMIN — KETOROLAC TROMETHAMINE 30 MG: 30 INJECTION, SOLUTION INTRAMUSCULAR; INTRAVENOUS at 04:12

## 2024-02-10 ASSESSMENT — COGNITIVE AND FUNCTIONAL STATUS - GENERAL
MOBILITY SCORE: 24
DAILY ACTIVITIY SCORE: 24

## 2024-02-10 ASSESSMENT — PAIN SCALES - GENERAL: PAINLEVEL_OUTOF10: 0 - NO PAIN

## 2024-02-10 ASSESSMENT — PAIN - FUNCTIONAL ASSESSMENT: PAIN_FUNCTIONAL_ASSESSMENT: 0-10

## 2024-02-10 NOTE — DISCHARGE INSTRUCTIONS
No need to follow up with general surgery. You can follow up with your PCP if you would like to.     Continue to eat a low fiber diet as this diet is good for those that suffer from diverticulitis.     Call office or come to ER if:  You have a fever of 100.4 Fahrenheit  You cannot tolerate food or water  Unable to urinate  New or worsening of symptoms  Chest pain or shortness of breath

## 2024-02-10 NOTE — DISCHARGE SUMMARY
Discharge Diagnosis  Ileus (CMS/HCC)    Issues Requiring Follow-Up  No need to follow up with general surgery. You can follow up with PCP if you would like regarding hospitalization.     Test Results Pending At Discharge  Pending Labs       No current pending labs.            Hospital Course  Patient is a 48 year old female that presented to the ED due to abdominal pain. CT abdomen and pelvis showed a small bowel obstruction. Patient was treated with IV fluids, nasogastric tube, and nothing to eat or drink. Patient then had a small bowel follow through that helped resolve the small bowel obstruction. Patient then put on a regular diet in which she is tolerating. Patient will be discharged in stable condition to home with no other needs besides a work excuse.     Pertinent Physical Exam At Time of Discharge  Physical Exam  Vitals reviewed.   Constitutional:       Appearance: Normal appearance.   HENT:      Head: Normocephalic.      Nose: Nose normal.      Mouth/Throat:      Mouth: Mucous membranes are moist.   Cardiovascular:      Rate and Rhythm: Normal rate.   Pulmonary:      Effort: Pulmonary effort is normal.   Abdominal:      General: Abdomen is flat. Bowel sounds are normal.      Palpations: Abdomen is soft.   Skin:     General: Skin is warm.      Capillary Refill: Capillary refill takes less than 2 seconds.   Neurological:      General: No focal deficit present.      Mental Status: She is alert and oriented to person, place, and time.   Psychiatric:         Mood and Affect: Mood normal.       Home Medications     Medication List      CONTINUE taking these medications     * amphetamine-dextroamphetamine 15 mg tablet; Commonly known as:   Adderall; Take 1 tablet (15 mg) by mouth 2 times daily after breakfast and   lunch.   * amphetamine-dextroamphetamine 15 mg tablet; Commonly known as:   Adderall; Take 1 tablet (15 mg) by mouth 2 times daily after breakfast and   lunch. Do not start before February 15, 2024.;  Start taking on: February   15, 2024   * amphetamine-dextroamphetamine 15 mg tablet; Commonly known as:   Adderall; Take 1 tablet (15 mg) by mouth 2 times daily after breakfast and   lunch. Do not start before March 15, 2024.; Start taking on: March 15,   2024  * This list has 3 medication(s) that are the same as other medications   prescribed for you. Read the directions carefully, and ask your doctor or   other care provider to review them with you.       Outpatient Follow-Up  Future Appointments   Date Time Provider Department Center   4/10/2024 11:00 AM SARMAD Fitzgerald-CNP [id number]     Time spent  37 minutes obtaining labs, imaging, recommendations, interview, assessment, examination, medication review/ordering, and EMR review.    Plan of care was discussed extensively with patient. Patient verbalized understanding through teach back method. All questions and concerns addressed upon examination.     Of note, this documentation is completed using the Dragon Dictation system (voice recognition software). There may be spelling and/or grammatical errors that were not corrected prior to final submission.     Jory Rodriguez APRN-CNP

## 2024-02-10 NOTE — NURSING NOTE
Discharge instructions given. Pt verbalizes understanding. No questions or concerns at this time.

## 2024-02-26 ENCOUNTER — APPOINTMENT (OUTPATIENT)
Dept: NEUROLOGY | Facility: CLINIC | Age: 49
End: 2024-02-26
Payer: COMMERCIAL

## 2024-04-08 NOTE — PROGRESS NOTES
Subjective   Linda Rankin is a 49 y.o.   female.  HPI  The patient is being seen today for their encephalopathy r/t their ADHD. They are currently taking Adderall 15mg BID and it has been effective. No problems finding the medication. The patient can tell when the medication wears off. The patient agrees that their quality of life has improved while taking this medication. Patient denies any chest pain, heart palpitations, sleep issues, appetite changes, weight loss, and mood changes while taking this medication. Neurological exam is normal. I have reviewed the medications and the chart. Review of systems are negative unless otherwise specified in HPI.    Objective   Neurological Exam  Mental Status  Awake, alert and oriented to person, place and time. Speech is normal. Language is fluent with no aphasia. Attention and concentration are normal.    Cranial Nerves  CN III, IV, VI: Pupils equal round and reactive to light bilaterally.  And EOM's Normal.    Motor   Strength is 5/5 throughout all four extremities.    Sensory  Light touch is normal in upper and lower extremities.     Reflexes  Deep tendon reflexes are 2+ and symmetric in all four extremities.    Gait  Casual gait is normal including stance, stride, and arm swing.    Physical Exam  Eyes:      Pupils: Pupils are equal, round, and reactive to light.   Cardiovascular:      Rate and Rhythm: Normal rate.   Neurological:      Motor: Motor strength is normal.     Deep Tendon Reflexes: Reflexes are normal and symmetric.   Psychiatric:         Speech: Speech normal.         Assessment/Plan   Discussed following up in 3 months. Medication was sent to pharmacy. Discussed with the patient the purpose of medication, as well as potential side effects to be aware of. Informed the patient about abuse potential of medication and the importance adhering to the controlled substance agreement. Advised patient to call office with any adverse reaction to medication.

## 2024-04-10 ENCOUNTER — OFFICE VISIT (OUTPATIENT)
Dept: NEUROLOGY | Facility: CLINIC | Age: 49
End: 2024-04-10
Payer: COMMERCIAL

## 2024-04-10 VITALS
HEART RATE: 87 BPM | WEIGHT: 207.8 LBS | DIASTOLIC BLOOD PRESSURE: 97 MMHG | BODY MASS INDEX: 31.49 KG/M2 | HEIGHT: 68 IN | SYSTOLIC BLOOD PRESSURE: 143 MMHG

## 2024-04-10 DIAGNOSIS — G93.40 ENCEPHALOPATHY: Primary | ICD-10-CM

## 2024-04-10 DIAGNOSIS — F90.2 ATTENTION DEFICIT HYPERACTIVITY DISORDER, COMBINED TYPE: ICD-10-CM

## 2024-04-10 PROBLEM — G93.9 DISORDER OF BRAIN: Status: ACTIVE | Noted: 2023-11-27

## 2024-04-10 PROBLEM — E66.9 OBESITY WITH BODY MASS INDEX 30 OR GREATER: Status: ACTIVE | Noted: 2018-04-17

## 2024-04-10 PROBLEM — Z90.3 HISTORY OF SLEEVE GASTRECTOMY: Status: ACTIVE | Noted: 2021-09-21

## 2024-04-10 PROBLEM — M54.50 CHRONIC LOW BACK PAIN: Status: ACTIVE | Noted: 2019-03-03

## 2024-04-10 PROBLEM — K76.0 STEATOSIS OF LIVER: Status: ACTIVE | Noted: 2021-07-13

## 2024-04-10 PROBLEM — F31.9 BIPOLAR DISORDER (MULTI): Status: ACTIVE | Noted: 2018-04-17

## 2024-04-10 PROBLEM — I10 PRIMARY HYPERTENSION: Status: ACTIVE | Noted: 2020-06-10

## 2024-04-10 PROBLEM — G89.29 CHRONIC LOW BACK PAIN: Status: ACTIVE | Noted: 2019-03-03

## 2024-04-10 PROCEDURE — 3080F DIAST BP >= 90 MM HG: CPT

## 2024-04-10 PROCEDURE — 3077F SYST BP >= 140 MM HG: CPT

## 2024-04-10 PROCEDURE — 99214 OFFICE O/P EST MOD 30 MIN: CPT

## 2024-04-10 PROCEDURE — 1036F TOBACCO NON-USER: CPT

## 2024-04-10 RX ORDER — DEXTROAMPHETAMINE SACCHARATE, AMPHETAMINE ASPARTATE, DEXTROAMPHETAMINE SULFATE AND AMPHETAMINE SULFATE 3.75; 3.75; 3.75; 3.75 MG/1; MG/1; MG/1; MG/1
15 TABLET ORAL
Qty: 60 TABLET | Refills: 0 | Status: SHIPPED | OUTPATIENT
Start: 2024-06-09

## 2024-04-10 RX ORDER — DEXTROAMPHETAMINE SACCHARATE, AMPHETAMINE ASPARTATE, DEXTROAMPHETAMINE SULFATE AND AMPHETAMINE SULFATE 3.75; 3.75; 3.75; 3.75 MG/1; MG/1; MG/1; MG/1
15 TABLET ORAL
Qty: 60 TABLET | Refills: 0 | Status: SHIPPED | OUTPATIENT
Start: 2024-04-10 | End: 2024-05-10

## 2024-04-10 RX ORDER — DEXTROAMPHETAMINE SACCHARATE, AMPHETAMINE ASPARTATE, DEXTROAMPHETAMINE SULFATE AND AMPHETAMINE SULFATE 3.75; 3.75; 3.75; 3.75 MG/1; MG/1; MG/1; MG/1
15 TABLET ORAL
Qty: 60 TABLET | Refills: 0 | Status: SHIPPED | OUTPATIENT
Start: 2024-05-09 | End: 2024-06-08

## 2024-04-10 ASSESSMENT — PATIENT HEALTH QUESTIONNAIRE - PHQ9
1. LITTLE INTEREST OR PLEASURE IN DOING THINGS: NOT AT ALL
SUM OF ALL RESPONSES TO PHQ9 QUESTIONS 1 & 2: 0
2. FEELING DOWN, DEPRESSED OR HOPELESS: NOT AT ALL

## 2024-06-29 NOTE — PROGRESS NOTES
Subjective   Linda Rankin is a 49 y.o.   female.  HPI  The patient is being seen today for their encephalopathy r/t their ADHD. They are currently taking Adderall 15mg BID and it has been effective. The patient can tell when the medication wears off. The patient agrees that their quality of life has improved while taking this medication. Patient denies any chest pain, heart palpitations, sleep issues, appetite changes, weight loss, and mood changes while taking this medication. Neurological exam is normal. I have reviewed the medications and the chart. Review of systems are negative unless otherwise specified in HPI.    Objective   Neurological Exam  Mental Status  Awake, alert and oriented to person, place and time. Speech is normal. Language is fluent with no aphasia. Attention and concentration are normal.    Cranial Nerves  CN III, IV, VI: Pupils equal round and reactive to light bilaterally.  And EOM's Normal.    Motor   Strength is 5/5 throughout all four extremities.    Sensory  Light touch is normal in upper and lower extremities.     Reflexes  Deep tendon reflexes are 2+ and symmetric in all four extremities.    Gait  Casual gait is normal including stance, stride, and arm swing.Normal toe walking. Normal heel walking.    Physical Exam  Eyes:      Pupils: Pupils are equal, round, and reactive to light.   Neurological:      Motor: Motor strength is normal.     Deep Tendon Reflexes: Reflexes are normal and symmetric.   Psychiatric:         Speech: Speech normal.           Assessment/Plan   Advised to take BP at home and let her PCP know.   Discussed following up in 3 months. Medication was sent to pharmacy. Discussed with the patient the purpose of medication, as well as potential side effects to be aware of. Informed the patient about abuse potential of medication and the importance adhering to the controlled substance agreement. Advised patient to call office with any adverse reaction to medication.

## 2024-07-03 ENCOUNTER — APPOINTMENT (OUTPATIENT)
Dept: NEUROLOGY | Facility: CLINIC | Age: 49
End: 2024-07-03
Payer: COMMERCIAL

## 2024-07-03 VITALS
HEART RATE: 88 BPM | DIASTOLIC BLOOD PRESSURE: 96 MMHG | HEIGHT: 68 IN | BODY MASS INDEX: 32.46 KG/M2 | WEIGHT: 214.2 LBS | SYSTOLIC BLOOD PRESSURE: 124 MMHG

## 2024-07-03 DIAGNOSIS — F90.2 ATTENTION DEFICIT HYPERACTIVITY DISORDER, COMBINED TYPE: ICD-10-CM

## 2024-07-03 DIAGNOSIS — G93.40 ENCEPHALOPATHY: Primary | ICD-10-CM

## 2024-07-03 PROCEDURE — 99214 OFFICE O/P EST MOD 30 MIN: CPT

## 2024-07-03 PROCEDURE — 3074F SYST BP LT 130 MM HG: CPT

## 2024-07-03 PROCEDURE — 3080F DIAST BP >= 90 MM HG: CPT

## 2024-07-03 PROCEDURE — 1036F TOBACCO NON-USER: CPT

## 2024-07-03 RX ORDER — DEXTROAMPHETAMINE SACCHARATE, AMPHETAMINE ASPARTATE, DEXTROAMPHETAMINE SULFATE AND AMPHETAMINE SULFATE 3.75; 3.75; 3.75; 3.75 MG/1; MG/1; MG/1; MG/1
15 TABLET ORAL
Qty: 60 TABLET | Refills: 0 | Status: SHIPPED | OUTPATIENT
Start: 2024-09-02

## 2024-07-03 RX ORDER — DEXTROAMPHETAMINE SACCHARATE, AMPHETAMINE ASPARTATE, DEXTROAMPHETAMINE SULFATE AND AMPHETAMINE SULFATE 3.75; 3.75; 3.75; 3.75 MG/1; MG/1; MG/1; MG/1
15 TABLET ORAL
Qty: 60 TABLET | Refills: 0 | Status: SHIPPED | OUTPATIENT
Start: 2024-08-02 | End: 2024-09-01

## 2024-07-03 RX ORDER — DEXTROAMPHETAMINE SACCHARATE, AMPHETAMINE ASPARTATE, DEXTROAMPHETAMINE SULFATE AND AMPHETAMINE SULFATE 3.75; 3.75; 3.75; 3.75 MG/1; MG/1; MG/1; MG/1
15 TABLET ORAL
Qty: 60 TABLET | Refills: 0 | Status: SHIPPED | OUTPATIENT
Start: 2024-07-03 | End: 2024-08-02

## 2024-07-03 ASSESSMENT — PATIENT HEALTH QUESTIONNAIRE - PHQ9
SUM OF ALL RESPONSES TO PHQ9 QUESTIONS 1 & 2: 0
2. FEELING DOWN, DEPRESSED OR HOPELESS: NOT AT ALL
1. LITTLE INTEREST OR PLEASURE IN DOING THINGS: NOT AT ALL

## 2024-09-17 NOTE — PROGRESS NOTES
Subjective   Linda Rankin is a 49 y.o.   female.  HPI  The patient is being seen today for their encephalopathy r/t their ADHD. They are currently taking Adderall 15mg BID and it has been effective. The patient can tell when the medication wears off, usually 3 or 4pm. The patient agrees that their quality of life has improved while taking this medication. Patient denies any chest pain, heart palpitations, sleep issues, appetite changes, weight loss, and mood changes while taking this medication. Neurological exam is normal. I have reviewed the medications and the chart. Review of systems are negative unless otherwise specified in HPI.    I have personally reviewed the [facility] report for this patient. I have considered the risks of abuse, dependence, addiction, and diversion. I believe that it is critically appropriate for this patient to be prescribed this medication based on documented diagnosis of ADHD. An updated contract between the patient and myself was signed, scanned into the EMR, and the patient agrees to the terms. Any deviation from this agreement will result in termination from my practice. CSA was signed and dated. UDS obtained, results are pending.   Objective   Neurological Exam  Mental Status  Awake, alert and oriented to person, place and time. Speech is normal. Language is fluent with no aphasia. Attention and concentration are normal.    Cranial Nerves  CN III, IV, VI: Pupils equal round and reactive to light bilaterally.  And EOM's Normal.    Motor   Strength is 5/5 throughout all four extremities.    Sensory  Light touch is normal in upper and lower extremities.     Reflexes  Deep tendon reflexes are 2+ and symmetric in all four extremities.    Gait  Casual gait is normal including stance, stride, and arm swing.Normal toe walking.    Physical Exam  Eyes:      Pupils: Pupils are equal, round, and reactive to light.   Neurological:      Motor: Motor strength is normal.     Deep Tendon Reflexes:  Reflexes are normal and symmetric.   Psychiatric:         Speech: Speech normal.           Assessment/Plan   Advised patient to take their blood pressure at home. Keep a daily log to monitor for hypertension (>140/>80) and update their PCP with results.   Discussed following up in 3 months. Medication was sent to pharmacy. Discussed with the patient the purpose of medication, as well as potential side effects to be aware of. Informed the patient about abuse potential of medication and the importance adhering to the controlled substance agreement. Advised patient to call office with any adverse reaction to medication.

## 2024-09-18 ENCOUNTER — APPOINTMENT (OUTPATIENT)
Dept: NEUROLOGY | Facility: CLINIC | Age: 49
End: 2024-09-18
Payer: COMMERCIAL

## 2024-09-18 VITALS
HEIGHT: 67 IN | BODY MASS INDEX: 33.81 KG/M2 | HEART RATE: 88 BPM | WEIGHT: 215.4 LBS | SYSTOLIC BLOOD PRESSURE: 146 MMHG | DIASTOLIC BLOOD PRESSURE: 91 MMHG

## 2024-09-18 DIAGNOSIS — G93.40 ENCEPHALOPATHY: Primary | ICD-10-CM

## 2024-09-18 DIAGNOSIS — F90.2 ATTENTION DEFICIT HYPERACTIVITY DISORDER, COMBINED TYPE: ICD-10-CM

## 2024-09-18 LAB
AMPHETAMINES UR QL SCN: ABNORMAL
BARBITURATES UR QL SCN: ABNORMAL
BENZODIAZ UR QL SCN: ABNORMAL
BZE UR QL SCN: ABNORMAL
CANNABINOIDS UR QL SCN: ABNORMAL
FENTANYL+NORFENTANYL UR QL SCN: ABNORMAL
METHADONE UR QL SCN: ABNORMAL
OPIATES UR QL SCN: ABNORMAL
OXYCODONE+OXYMORPHONE UR QL SCN: ABNORMAL
PCP UR QL SCN: ABNORMAL

## 2024-09-18 PROCEDURE — 99214 OFFICE O/P EST MOD 30 MIN: CPT

## 2024-09-18 PROCEDURE — 3077F SYST BP >= 140 MM HG: CPT

## 2024-09-18 PROCEDURE — 3080F DIAST BP >= 90 MM HG: CPT

## 2024-09-18 PROCEDURE — 3008F BODY MASS INDEX DOCD: CPT

## 2024-09-18 PROCEDURE — 80359 METHYLENEDIOXYAMPHETAMINES: CPT

## 2024-09-18 PROCEDURE — 1036F TOBACCO NON-USER: CPT

## 2024-09-18 RX ORDER — DEXTROAMPHETAMINE SACCHARATE, AMPHETAMINE ASPARTATE, DEXTROAMPHETAMINE SULFATE AND AMPHETAMINE SULFATE 3.75; 3.75; 3.75; 3.75 MG/1; MG/1; MG/1; MG/1
15 TABLET ORAL
Qty: 60 TABLET | Refills: 0 | Status: SHIPPED | OUTPATIENT
Start: 2024-10-17 | End: 2024-11-16

## 2024-09-18 RX ORDER — DEXTROAMPHETAMINE SACCHARATE, AMPHETAMINE ASPARTATE, DEXTROAMPHETAMINE SULFATE AND AMPHETAMINE SULFATE 3.75; 3.75; 3.75; 3.75 MG/1; MG/1; MG/1; MG/1
15 TABLET ORAL
Qty: 60 TABLET | Refills: 0 | Status: SHIPPED | OUTPATIENT
Start: 2024-11-17

## 2024-09-18 RX ORDER — DEXTROAMPHETAMINE SACCHARATE, AMPHETAMINE ASPARTATE, DEXTROAMPHETAMINE SULFATE AND AMPHETAMINE SULFATE 3.75; 3.75; 3.75; 3.75 MG/1; MG/1; MG/1; MG/1
15 TABLET ORAL
Qty: 60 TABLET | Refills: 0 | Status: SHIPPED | OUTPATIENT
Start: 2024-09-18 | End: 2024-10-18

## 2024-09-22 LAB
AMPHET UR-MCNC: 4870 NG/ML
MDA UR-MCNC: <200 NG/ML
MDEA UR-MCNC: <200 NG/ML
MDMA UR-MCNC: <200 NG/ML
METHAMPHET UR-MCNC: <200 NG/ML
PHENTERMINE UR CFM-MCNC: <200 NG/ML

## 2024-12-18 ENCOUNTER — APPOINTMENT (OUTPATIENT)
Dept: NEUROLOGY | Facility: CLINIC | Age: 49
End: 2024-12-18
Payer: COMMERCIAL

## 2024-12-18 VITALS
BODY MASS INDEX: 33.53 KG/M2 | HEART RATE: 103 BPM | DIASTOLIC BLOOD PRESSURE: 88 MMHG | SYSTOLIC BLOOD PRESSURE: 132 MMHG | WEIGHT: 213.6 LBS | HEIGHT: 67 IN

## 2024-12-18 DIAGNOSIS — F90.2 ATTENTION DEFICIT HYPERACTIVITY DISORDER, COMBINED TYPE: ICD-10-CM

## 2024-12-18 DIAGNOSIS — G93.40 ENCEPHALOPATHY, UNSPECIFIED TYPE: Primary | ICD-10-CM

## 2024-12-18 PROCEDURE — 3079F DIAST BP 80-89 MM HG: CPT

## 2024-12-18 PROCEDURE — 1036F TOBACCO NON-USER: CPT

## 2024-12-18 PROCEDURE — 99214 OFFICE O/P EST MOD 30 MIN: CPT

## 2024-12-18 PROCEDURE — 3075F SYST BP GE 130 - 139MM HG: CPT

## 2024-12-18 PROCEDURE — 3008F BODY MASS INDEX DOCD: CPT

## 2024-12-18 RX ORDER — DEXTROAMPHETAMINE SACCHARATE, AMPHETAMINE ASPARTATE, DEXTROAMPHETAMINE SULFATE AND AMPHETAMINE SULFATE 3.75; 3.75; 3.75; 3.75 MG/1; MG/1; MG/1; MG/1
15 TABLET ORAL
Qty: 60 TABLET | Refills: 0 | Status: SHIPPED | OUTPATIENT
Start: 2025-01-17 | End: 2025-02-16

## 2024-12-18 RX ORDER — DEXTROAMPHETAMINE SACCHARATE, AMPHETAMINE ASPARTATE, DEXTROAMPHETAMINE SULFATE AND AMPHETAMINE SULFATE 3.75; 3.75; 3.75; 3.75 MG/1; MG/1; MG/1; MG/1
15 TABLET ORAL
Qty: 60 TABLET | Refills: 0 | Status: SHIPPED | OUTPATIENT
Start: 2024-12-18 | End: 2025-01-17

## 2024-12-18 RX ORDER — DEXTROAMPHETAMINE SACCHARATE, AMPHETAMINE ASPARTATE, DEXTROAMPHETAMINE SULFATE AND AMPHETAMINE SULFATE 3.75; 3.75; 3.75; 3.75 MG/1; MG/1; MG/1; MG/1
15 TABLET ORAL
Qty: 60 TABLET | Refills: 0 | Status: SHIPPED | OUTPATIENT
Start: 2025-02-17

## 2024-12-18 NOTE — PROGRESS NOTES
Subjective   Linda Rankin is a 49 y.o.   female.  HPI  The patient is being seen today for their encephalopathy r/t their ADHD. They are currently taking Adderall 15mg BID and it has been effective. The patient can tell when the medication wears off. The patient agrees that their quality of life has improved while taking this medication. Patient denies any chest pain, heart palpitations, sleep issues, appetite changes, weight loss, and mood changes while taking this medication. I have reviewed the medications and the chart. Review of systems are negative unless otherwise specified in HPI.    Objective   Neurological Exam  Mental Status  Awake, alert and oriented to person, place and time. Speech is normal. Language is fluent with no aphasia. Attention and concentration are normal.    Cranial Nerves  CN III, IV, VI: Pupils equal round and reactive to light bilaterally.  And EOM's Normal.    Motor   Strength is 5/5 throughout all four extremities.    Sensory  Light touch is normal in upper and lower extremities.     Reflexes  Deep tendon reflexes are 2+ and symmetric in all four extremities.    Gait  Casual gait is normal including stance, stride, and arm swing.Normal toe walking. Normal heel walking.    Physical Exam  Eyes:      Pupils: Pupils are equal, round, and reactive to light.   Neurological:      Motor: Motor strength is normal.     Deep Tendon Reflexes: Reflexes are normal and symmetric.   Psychiatric:         Speech: Speech normal.           Assessment/Plan   #Encephalopathy in the setting of ADHD    Discussed following up in 3 months. Medication was sent to pharmacy. Discussed with the patient the purpose of medication, as well as potential side effects to be aware of. Informed the patient about abuse potential of medication and the importance adhering to the controlled substance agreement. Advised patient to call office with any adverse reaction to medication.

## 2025-03-11 NOTE — PROGRESS NOTES
Subjective   Linda Rankin is a 50 y.o.   female.  HPI  The patient is being seen today for their encephalopathy r/t their ADHD. They are currently taking Adderall 15mg BID and it has been effective. The patient can tell when the medication wears off, usually 1-2pm and takes second dose. The patient agrees that their quality of life has improved while taking this medication. Patient denies any chest pain, heart palpitations, sleep issues, appetite changes, weight loss, and mood changes while taking this medication. I have reviewed the medications and the chart. Review of systems are negative unless otherwise specified in HPI.    Objective   Neurological Exam  Mental Status  Awake, alert and oriented to person, place and time. Speech is normal. Language is fluent with no aphasia. Attention and concentration are normal.    Cranial Nerves  CN III, IV, VI: Pupils equal round and reactive to light bilaterally.  And EOM's Normal.    Motor   Strength is 5/5 throughout all four extremities.    Sensory  Light touch is normal in upper and lower extremities.     Reflexes  Deep tendon reflexes are 2+ and symmetric in all four extremities.    Gait  Casual gait is normal including stance, stride, and arm swing.Normal toe walking. Normal heel walking.    Physical Exam  Eyes:      Pupils: Pupils are equal, round, and reactive to light.   Neurological:      Motor: Motor strength is normal.     Deep Tendon Reflexes: Reflexes are normal and symmetric.   Psychiatric:         Speech: Speech normal.           Assessment/Plan   #Encephalopathy in the setting of ADHD    Discussed following up in 3 months. Medication was sent to pharmacy. Discussed with the patient the purpose of medication, as well as potential side effects to be aware of. Informed the patient about abuse potential of medication and the importance adhering to the controlled substance agreement. Advised patient to call office with any adverse reaction to medication.

## 2025-03-12 ENCOUNTER — APPOINTMENT (OUTPATIENT)
Dept: NEUROLOGY | Facility: CLINIC | Age: 50
End: 2025-03-12
Payer: COMMERCIAL

## 2025-03-12 VITALS
DIASTOLIC BLOOD PRESSURE: 104 MMHG | WEIGHT: 210.6 LBS | BODY MASS INDEX: 33.06 KG/M2 | HEART RATE: 102 BPM | HEIGHT: 67 IN | SYSTOLIC BLOOD PRESSURE: 142 MMHG

## 2025-03-12 DIAGNOSIS — G93.40 ENCEPHALOPATHY, UNSPECIFIED TYPE: Primary | ICD-10-CM

## 2025-03-12 DIAGNOSIS — F90.2 ATTENTION DEFICIT HYPERACTIVITY DISORDER, COMBINED TYPE: ICD-10-CM

## 2025-03-12 PROCEDURE — 3008F BODY MASS INDEX DOCD: CPT

## 2025-03-12 PROCEDURE — 3077F SYST BP >= 140 MM HG: CPT

## 2025-03-12 PROCEDURE — 1036F TOBACCO NON-USER: CPT

## 2025-03-12 PROCEDURE — 3080F DIAST BP >= 90 MM HG: CPT

## 2025-03-12 PROCEDURE — 99214 OFFICE O/P EST MOD 30 MIN: CPT

## 2025-03-12 RX ORDER — DEXTROAMPHETAMINE SACCHARATE, AMPHETAMINE ASPARTATE, DEXTROAMPHETAMINE SULFATE AND AMPHETAMINE SULFATE 3.75; 3.75; 3.75; 3.75 MG/1; MG/1; MG/1; MG/1
15 TABLET ORAL
Qty: 60 TABLET | Refills: 0 | Status: SHIPPED | OUTPATIENT
Start: 2025-04-11 | End: 2025-05-11

## 2025-03-12 RX ORDER — DEXTROAMPHETAMINE SACCHARATE, AMPHETAMINE ASPARTATE, DEXTROAMPHETAMINE SULFATE AND AMPHETAMINE SULFATE 3.75; 3.75; 3.75; 3.75 MG/1; MG/1; MG/1; MG/1
15 TABLET ORAL
Qty: 60 TABLET | Refills: 0 | Status: SHIPPED | OUTPATIENT
Start: 2025-03-12 | End: 2025-04-11

## 2025-03-12 RX ORDER — DEXTROAMPHETAMINE SACCHARATE, AMPHETAMINE ASPARTATE, DEXTROAMPHETAMINE SULFATE AND AMPHETAMINE SULFATE 3.75; 3.75; 3.75; 3.75 MG/1; MG/1; MG/1; MG/1
15 TABLET ORAL
Qty: 60 TABLET | Refills: 0 | Status: SHIPPED | OUTPATIENT
Start: 2025-05-11

## 2025-03-12 ASSESSMENT — PATIENT HEALTH QUESTIONNAIRE - PHQ9
2. FEELING DOWN, DEPRESSED OR HOPELESS: NOT AT ALL
1. LITTLE INTEREST OR PLEASURE IN DOING THINGS: NOT AT ALL
SUM OF ALL RESPONSES TO PHQ9 QUESTIONS 1 & 2: 0

## 2025-04-13 ENCOUNTER — HOSPITAL ENCOUNTER (EMERGENCY)
Facility: HOSPITAL | Age: 50
Discharge: HOME | End: 2025-04-13
Attending: STUDENT IN AN ORGANIZED HEALTH CARE EDUCATION/TRAINING PROGRAM
Payer: COMMERCIAL

## 2025-04-13 ENCOUNTER — APPOINTMENT (OUTPATIENT)
Dept: RADIOLOGY | Facility: HOSPITAL | Age: 50
End: 2025-04-13
Payer: COMMERCIAL

## 2025-04-13 VITALS
TEMPERATURE: 98.8 F | SYSTOLIC BLOOD PRESSURE: 139 MMHG | OXYGEN SATURATION: 98 % | HEART RATE: 73 BPM | DIASTOLIC BLOOD PRESSURE: 90 MMHG | BODY MASS INDEX: 32.65 KG/M2 | WEIGHT: 208 LBS | HEIGHT: 67 IN | RESPIRATION RATE: 16 BRPM

## 2025-04-13 DIAGNOSIS — R51.9 ACUTE NONINTRACTABLE HEADACHE, UNSPECIFIED HEADACHE TYPE: Primary | ICD-10-CM

## 2025-04-13 LAB — B-HCG SERPL-ACNC: <2 MIU/ML

## 2025-04-13 PROCEDURE — 70450 CT HEAD/BRAIN W/O DYE: CPT

## 2025-04-13 PROCEDURE — 36415 COLL VENOUS BLD VENIPUNCTURE: CPT | Performed by: STUDENT IN AN ORGANIZED HEALTH CARE EDUCATION/TRAINING PROGRAM

## 2025-04-13 PROCEDURE — 99284 EMERGENCY DEPT VISIT MOD MDM: CPT | Mod: 25 | Performed by: STUDENT IN AN ORGANIZED HEALTH CARE EDUCATION/TRAINING PROGRAM

## 2025-04-13 PROCEDURE — 70450 CT HEAD/BRAIN W/O DYE: CPT | Performed by: RADIOLOGY

## 2025-04-13 PROCEDURE — 2500000001 HC RX 250 WO HCPCS SELF ADMINISTERED DRUGS (ALT 637 FOR MEDICARE OP): Performed by: STUDENT IN AN ORGANIZED HEALTH CARE EDUCATION/TRAINING PROGRAM

## 2025-04-13 PROCEDURE — 84702 CHORIONIC GONADOTROPIN TEST: CPT | Performed by: STUDENT IN AN ORGANIZED HEALTH CARE EDUCATION/TRAINING PROGRAM

## 2025-04-13 RX ORDER — ACETAMINOPHEN 325 MG/1
975 TABLET ORAL ONCE
Status: COMPLETED | OUTPATIENT
Start: 2025-04-13 | End: 2025-04-13

## 2025-04-13 RX ADMIN — ACETAMINOPHEN 975 MG: 325 TABLET ORAL at 18:47

## 2025-04-13 ASSESSMENT — PAIN DESCRIPTION - PAIN TYPE
TYPE: ACUTE PAIN
TYPE: ACUTE PAIN

## 2025-04-13 ASSESSMENT — PAIN SCALES - GENERAL
PAINLEVEL_OUTOF10: 7
PAINLEVEL_OUTOF10: 5 - MODERATE PAIN
PAINLEVEL_OUTOF10: 8
PAINLEVEL_OUTOF10: 7

## 2025-04-13 ASSESSMENT — COLUMBIA-SUICIDE SEVERITY RATING SCALE - C-SSRS
2. HAVE YOU ACTUALLY HAD ANY THOUGHTS OF KILLING YOURSELF?: NO
6. HAVE YOU EVER DONE ANYTHING, STARTED TO DO ANYTHING, OR PREPARED TO DO ANYTHING TO END YOUR LIFE?: NO
1. IN THE PAST MONTH, HAVE YOU WISHED YOU WERE DEAD OR WISHED YOU COULD GO TO SLEEP AND NOT WAKE UP?: NO

## 2025-04-13 ASSESSMENT — PAIN DESCRIPTION - LOCATION
LOCATION: HEAD

## 2025-04-13 ASSESSMENT — PAIN DESCRIPTION - ORIENTATION
ORIENTATION: RIGHT
ORIENTATION: RIGHT

## 2025-04-13 ASSESSMENT — PAIN DESCRIPTION - ONSET: ONSET: ONGOING

## 2025-04-13 ASSESSMENT — PAIN - FUNCTIONAL ASSESSMENT
PAIN_FUNCTIONAL_ASSESSMENT: 0-10
PAIN_FUNCTIONAL_ASSESSMENT: 0-10

## 2025-04-13 ASSESSMENT — PAIN DESCRIPTION - DESCRIPTORS
DESCRIPTORS: TENDER
DESCRIPTORS: TENDER

## 2025-04-13 ASSESSMENT — PAIN DESCRIPTION - FREQUENCY
FREQUENCY: CONSTANT/CONTINUOUS
FREQUENCY: CONSTANT/CONTINUOUS

## 2025-04-14 NOTE — ED PROVIDER NOTES
HPI: The patient is a 50-year-old with history of anxiety and bipolar disorder who presents to the Emergency Department with a chief complaint of right-sided headache.  Patient reports she awoke with a right-sided headache this morning.  She reports the headache was not the worst headache of her life and reports that it worsened throughout the morning which led her to come to the ER.  She reports that when she palpates the area of her scalp that is hurting, she is able to reproduce the pain and make it worse.  She reports has never had some like this before and does not believe she fell or injured the area.  Denies any fevers or chills or rashes or lesions.  She reports that the headache is worse whenever her hair gets tugged on and whenever she palpates the area.  She reports is moderate in intensity.  Denies any jaw claudication or visual disturbances or change.  No fevers or chills.  No photophobia.  She reports no nausea no similar symptoms.     PAST MEDICAL HISTORY:  as per HPI  ALLERGIES:  as per HPI  MEDICATIONS:  as per HPI  FAMILY HISTORY: as per HPI  SURGICAL HISTORY: as per HPI  SOCIAL HISTORY: as per HPI     PHYSICAL EXAM:  VITAL SIGNS: Nursing notes reviewed.  GENERAL:  Alert and interactive  EYES:   Eyes track.  ENT:  Airway patent.  No lesions inside either external auditory canal or on the tympanic membrane.  NECK: No meningismus or neck stiffness.  RESPIRATORY:  Nonlabored breathing.  CARDIOVASCULAR:  [Regular rate.] [Regular rhythm.]  GASTROINTESTINAL:  No distension.  MUSCULOSKELETAL:  No deformity.   NEUROLOGICAL:  Awake.  Tracks with eyes, PERRL, EOMI, equal sensation in V1-V3, no facial droop, sounds equal in both ears, 5/5 w/ strength shoulder raise, tongue protrudes at midline, soft palate raises symmetrically 5/5 strength in UE and LE, no deficit with light touch, normal finger to nose and heel to shin, normal gait  SKIN:  Dry.  HEAD: Reproducible tenderness to palpation over the right  parietal scalp.  No masses or lesions on the scalp.  No bony deformities to the skull in the area of pain.        MEDICAL DECISION MAKING (MDM):     DIAGNOSTIC STUDIES  Labs: hCG  Radiology: CT head       REVIEW OF OLD RECORDS: Reviewed outpatient progress note from 4/2/2025     SUMMARY:  The patient is admitted to the Emergency Department for evaluation of above. Complete history and physical examination was performed by me.  Patient presents with a headache that is reproducible to palpation over the scalp.  No signs of a local cellulitis or abscess.  I did consider early shingles but there is no other signs of vesicular rash anywhere and I think this is less likely the cause.  No evidence of Ethel Hunt syndrome or herpes ophthalmicus.  Low suspicion for meningitis and encephalitis.  Given the headache was not sudden onset or maximal in onset not worst of her life, my suspicion for subarachnoid hemorrhage is low.  Given the time course, suspicion for dural venous thrombus is also low and given no significant hypertension, doubt press.  Low suspicion for carbon oxide poisoning given the headache seem to worsen while she was at work and there was no one else that she was around with similar symptoms.  Doubt giant cell arteritis.  I did get a CT of her head to look if there was a extracranial soft tissue lesion underneath the scalp, but none was present on CT.  Patient was given Tylenol with mild improvement in her symptoms.  The exact etiology of her presentation is unclear but my suspicion for life-threatening pathology is low.  It is possible that she had a minor trauma while she was sleeping and that this is caused a contusion in the area.  I did discuss the diagnostic uncertainty with her and did recommend a low threshold to return if she has any new or worsening symptoms.  I did facilitate outpatient follow-up with neurology for ongoing evaluation and care.  The work-up and plan were discussed with the  patient/caregiver and questions were answered regarding the ED visit.  Educational materials were provided as well as return precautions including returning for any persisting or worsening symptoms.  Patient was recommended to follow-up with PCP in the next few days in addition to any potential specialists that were discussed.  The patient/family expressed understanding and agreed to the described plan.  Patient was discharged in stable condition.     DIAGNOSIS:    Headache     DISPOSITION:    1) discharged     Joshua Bocanegra MD  04/14/25 0042

## 2025-05-29 ENCOUNTER — HOSPITAL ENCOUNTER (EMERGENCY)
Facility: HOSPITAL | Age: 50
Discharge: HOME | End: 2025-05-29
Payer: COMMERCIAL

## 2025-05-29 ENCOUNTER — APPOINTMENT (OUTPATIENT)
Dept: RADIOLOGY | Facility: HOSPITAL | Age: 50
End: 2025-05-29
Payer: COMMERCIAL

## 2025-05-29 VITALS
WEIGHT: 194 LBS | DIASTOLIC BLOOD PRESSURE: 87 MMHG | TEMPERATURE: 98.1 F | RESPIRATION RATE: 17 BRPM | OXYGEN SATURATION: 98 % | HEIGHT: 68 IN | HEART RATE: 92 BPM | SYSTOLIC BLOOD PRESSURE: 159 MMHG | BODY MASS INDEX: 29.4 KG/M2

## 2025-05-29 DIAGNOSIS — R11.0 NAUSEA: Primary | ICD-10-CM

## 2025-05-29 DIAGNOSIS — T88.7XXA MEDICATION SIDE EFFECT: ICD-10-CM

## 2025-05-29 LAB
ALBUMIN SERPL BCP-MCNC: 4.5 G/DL (ref 3.4–5)
ALP SERPL-CCNC: 40 U/L (ref 33–110)
ALT SERPL W P-5'-P-CCNC: 12 U/L (ref 7–45)
ANION GAP SERPL CALC-SCNC: 12 MMOL/L (ref 10–20)
AST SERPL W P-5'-P-CCNC: 15 U/L (ref 9–39)
BASOPHILS # BLD AUTO: 0.02 X10*3/UL (ref 0–0.1)
BASOPHILS NFR BLD AUTO: 0.3 %
BILIRUB SERPL-MCNC: 0.5 MG/DL (ref 0–1.2)
BUN SERPL-MCNC: 9 MG/DL (ref 6–23)
CALCIUM SERPL-MCNC: 9.1 MG/DL (ref 8.6–10.3)
CHLORIDE SERPL-SCNC: 106 MMOL/L (ref 98–107)
CO2 SERPL-SCNC: 26 MMOL/L (ref 21–32)
CREAT SERPL-MCNC: 0.67 MG/DL (ref 0.5–1.05)
EGFRCR SERPLBLD CKD-EPI 2021: >90 ML/MIN/1.73M*2
EOSINOPHIL # BLD AUTO: 0.03 X10*3/UL (ref 0–0.7)
EOSINOPHIL NFR BLD AUTO: 0.4 %
ERYTHROCYTE [DISTWIDTH] IN BLOOD BY AUTOMATED COUNT: 11.9 % (ref 11.5–14.5)
GLUCOSE SERPL-MCNC: 91 MG/DL (ref 74–99)
HCT VFR BLD AUTO: 40.7 % (ref 36–46)
HGB BLD-MCNC: 14.2 G/DL (ref 12–16)
IMM GRANULOCYTES # BLD AUTO: 0.01 X10*3/UL (ref 0–0.7)
IMM GRANULOCYTES NFR BLD AUTO: 0.1 % (ref 0–0.9)
LIPASE SERPL-CCNC: 22 U/L (ref 9–82)
LYMPHOCYTES # BLD AUTO: 2.54 X10*3/UL (ref 1.2–4.8)
LYMPHOCYTES NFR BLD AUTO: 34.7 %
MAGNESIUM SERPL-MCNC: 2.03 MG/DL (ref 1.6–2.4)
MCH RBC QN AUTO: 30.4 PG (ref 26–34)
MCHC RBC AUTO-ENTMCNC: 34.9 G/DL (ref 32–36)
MCV RBC AUTO: 87 FL (ref 80–100)
MONOCYTES # BLD AUTO: 0.49 X10*3/UL (ref 0.1–1)
MONOCYTES NFR BLD AUTO: 6.7 %
NEUTROPHILS # BLD AUTO: 4.22 X10*3/UL (ref 1.2–7.7)
NEUTROPHILS NFR BLD AUTO: 57.8 %
NRBC BLD-RTO: 0 /100 WBCS (ref 0–0)
PLATELET # BLD AUTO: 208 X10*3/UL (ref 150–450)
POTASSIUM SERPL-SCNC: 3.9 MMOL/L (ref 3.5–5.3)
PROT SERPL-MCNC: 6.8 G/DL (ref 6.4–8.2)
RBC # BLD AUTO: 4.67 X10*6/UL (ref 4–5.2)
SODIUM SERPL-SCNC: 140 MMOL/L (ref 136–145)
WBC # BLD AUTO: 7.3 X10*3/UL (ref 4.4–11.3)

## 2025-05-29 PROCEDURE — 36415 COLL VENOUS BLD VENIPUNCTURE: CPT | Performed by: PHYSICIAN ASSISTANT

## 2025-05-29 PROCEDURE — 74018 RADEX ABDOMEN 1 VIEW: CPT

## 2025-05-29 PROCEDURE — 83690 ASSAY OF LIPASE: CPT | Performed by: PHYSICIAN ASSISTANT

## 2025-05-29 PROCEDURE — 80053 COMPREHEN METABOLIC PANEL: CPT | Performed by: PHYSICIAN ASSISTANT

## 2025-05-29 PROCEDURE — 96374 THER/PROPH/DIAG INJ IV PUSH: CPT

## 2025-05-29 PROCEDURE — 83735 ASSAY OF MAGNESIUM: CPT | Performed by: PHYSICIAN ASSISTANT

## 2025-05-29 PROCEDURE — 74018 RADEX ABDOMEN 1 VIEW: CPT | Mod: FOREIGN READ | Performed by: RADIOLOGY

## 2025-05-29 PROCEDURE — 99284 EMERGENCY DEPT VISIT MOD MDM: CPT | Mod: 25

## 2025-05-29 PROCEDURE — 85025 COMPLETE CBC W/AUTO DIFF WBC: CPT | Performed by: PHYSICIAN ASSISTANT

## 2025-05-29 PROCEDURE — 96361 HYDRATE IV INFUSION ADD-ON: CPT

## 2025-05-29 PROCEDURE — 2500000004 HC RX 250 GENERAL PHARMACY W/ HCPCS (ALT 636 FOR OP/ED): Performed by: PHYSICIAN ASSISTANT

## 2025-05-29 RX ORDER — ONDANSETRON 4 MG/1
4 TABLET, ORALLY DISINTEGRATING ORAL EVERY 8 HOURS PRN
Qty: 20 TABLET | Refills: 0 | Status: SHIPPED | OUTPATIENT
Start: 2025-05-29 | End: 2025-06-05

## 2025-05-29 RX ORDER — ONDANSETRON HYDROCHLORIDE 2 MG/ML
4 INJECTION, SOLUTION INTRAVENOUS ONCE
Status: COMPLETED | OUTPATIENT
Start: 2025-05-29 | End: 2025-05-29

## 2025-05-29 RX ADMIN — ONDANSETRON 4 MG: 2 INJECTION INTRAMUSCULAR; INTRAVENOUS at 17:06

## 2025-05-29 RX ADMIN — SODIUM CHLORIDE 1000 ML: 0.9 INJECTION, SOLUTION INTRAVENOUS at 17:06

## 2025-05-29 ASSESSMENT — LIFESTYLE VARIABLES
EVER HAD A DRINK FIRST THING IN THE MORNING TO STEADY YOUR NERVES TO GET RID OF A HANGOVER: NO
HAVE PEOPLE ANNOYED YOU BY CRITICIZING YOUR DRINKING: NO
HAVE YOU EVER FELT YOU SHOULD CUT DOWN ON YOUR DRINKING: NO
TOTAL SCORE: 0
EVER FELT BAD OR GUILTY ABOUT YOUR DRINKING: NO

## 2025-05-29 ASSESSMENT — PAIN SCALES - GENERAL: PAINLEVEL_OUTOF10: 0 - NO PAIN

## 2025-05-29 ASSESSMENT — PAIN - FUNCTIONAL ASSESSMENT: PAIN_FUNCTIONAL_ASSESSMENT: 0-10

## 2025-05-29 NOTE — ED PROVIDER NOTES
HPI   Chief Complaint   Patient presents with    Nausea     Pt recently started semaglutide and has been having increasing nausea and no appetite         History provided by:  Patient   used: No      50-year-old female presents with feeling like she is dehydrated.  She states that she has been on semaglutide, but with the FDA regulations they had to add vitamin B12 to the medication and she thinks that this is making her nauseous.  She has been nauseous ever since she had to take the vitamin B12.  Her last injection was 5 days ago.  She is constipated and had a bowel movement yesterday, but took 25 minutes.  She states that she cannot eat or drink because she does not feel hungry and is nauseous and has had just a little Gatorade within the past 2 days.  Denies fever, dizziness, weakness, diarrhea, abd or back pain, dysuria, SOB or CP    ROS negative unless otherwise stated in HPI          Patient History   Medical History[1]  Surgical History[2]  Family History[3]  Social History[4]    Physical Exam   ED Triage Vitals [05/29/25 1607]   Temperature Heart Rate Respirations BP   36.7 °C (98.1 °F) 92 17 159/87      Pulse Ox Temp Source Heart Rate Source Patient Position   98 % Temporal Monitor --      BP Location FiO2 (%)     -- --       Physical Exam    General: alert, no distress, well nourished, talking in full and complete sentences  Skin: dry, intact, no rashes  Head: atraumatic  Eyes: EOMI, PERRLA, normal conjunctiva  Throat: no stridor, no hot potato voice  Nose: nares patent  Mouth: mucous membranes moist  Neck: supple  Respiratory: non labored breathing  Abd: soft, NT, normal BS, no peritoneal signs  Extremities: FROM X4, strength +5/5,  capillary refill intact  Neuro: A&Ox3  Psych: cooperative, appropriate mood        ED Course & MDM   ED Course as of 05/29/25 1729   Thu May 29, 2025   1633 Patient will be given IV fluids and 4 mg IV Zofran. [JL]   1720 No significant findings on my  preliminary reading of the abdominal x-ray [JL]   1726 No significant lab abnormalities [JL]   1728 Patient states that she feels improved with the medication.  Likely side effect to her semaglutide and she will be given a prescription for Zofran and follow-up with the provider who prescribes this to discuss the medication.  Afebrile, not tachycardic, not tachypneic, not hypoxic, tolerating PO, non toxic appearing and ambulating at baseline at discharge. Hemodynamically intact. Patient instructed on return precautions. All questions answered. Educated on SE of meds. Patient in agreement with treatment.   [JL]      ED Course User Index  [JL] Caity Walker PA-C         Diagnoses as of 05/29/25 1729   Nausea   Medication side effect                 No data recorded     Jonathan Coma Scale Score: 15 (05/29/25 1608 : Anne Messina RN)                     Labs Reviewed   MAGNESIUM - Normal       Result Value    Magnesium 2.03     COMPREHENSIVE METABOLIC PANEL - Normal    Glucose 91      Sodium 140      Potassium 3.9      Chloride 106      Bicarbonate 26      Anion Gap 12      Urea Nitrogen 9      Creatinine 0.67      eGFR >90      Calcium 9.1      Albumin 4.5      Alkaline Phosphatase 40      Total Protein 6.8      AST 15      Bilirubin, Total 0.5      ALT 12     LIPASE - Normal    Lipase 22      Narrative:     Venipuncture immediately after or during the administration of Metamizole may lead to falsely low results. Testing should be performed immediately prior to Metamizole dosing.   CBC WITH AUTO DIFFERENTIAL    WBC 7.3      nRBC 0.0      RBC 4.67      Hemoglobin 14.2      Hematocrit 40.7      MCV 87      MCH 30.4      MCHC 34.9      RDW 11.9      Platelets 208      Neutrophils % 57.8      Immature Granulocytes %, Automated 0.1      Lymphocytes % 34.7      Monocytes % 6.7      Eosinophils % 0.4      Basophils % 0.3      Neutrophils Absolute 4.22      Immature Granulocytes Absolute, Automated 0.01      Lymphocytes  Absolute 2.54      Monocytes Absolute 0.49      Eosinophils Absolute 0.03      Basophils Absolute 0.02     GRAY TOP      XR abdomen 1 view    (Results Pending)           Medical Decision Making      Procedure  Procedures       [1] No past medical history on file.  [2]   Past Surgical History:  Procedure Laterality Date    OTHER SURGICAL HISTORY  11/26/2018    Gallbladder surgery    TONSILLECTOMY      TUBAL LIGATION     [3] No family history on file.  [4]   Social History  Tobacco Use    Smoking status: Never    Smokeless tobacco: Never   Vaping Use    Vaping status: Never Used   Substance Use Topics    Alcohol use: Not Currently    Drug use: Never        Caity Walker PA-C  05/29/25 1723

## 2025-05-30 LAB
HOLD SPECIMEN: NORMAL

## 2025-06-02 NOTE — PROGRESS NOTES
Subjective   Linda Rankin is a 50 y.o.   female.  HPI  The patient is being seen today for their encephalopathy r/t their ADHD. They are currently taking Adderall 15mg BID and it has been effective. The patient can tell when the medication wears off. The patient agrees that their quality of life has improved while taking this medication. Patient denies any chest pain, heart palpitations, sleep issues, appetite changes, weight loss, and mood changes while taking this medication. She was seen in the ED in April for headache and referral was given. She denies headache at this time. I have reviewed the medications and the chart. Review of systems are negative unless otherwise specified in HPI.    Objective   Neurological Exam  Mental Status  Awake, alert and oriented to person, place and time. Speech is normal. Language is fluent with no aphasia. Attention and concentration are normal.    Cranial Nerves  CN III, IV, VI: Pupils equal round and reactive to light bilaterally.  And EOM's Normal.    Motor   Strength is 5/5 throughout all four extremities.    Sensory  Light touch is normal in upper and lower extremities.     Reflexes  Deep tendon reflexes are 2+ and symmetric in all four extremities.    Gait  Casual gait is normal including stance, stride, and arm swing.Normal toe walking. Normal heel walking.    Physical Exam  Eyes:      Pupils: Pupils are equal, round, and reactive to light.   Neurological:      Motor: Motor strength is normal.     Deep Tendon Reflexes: Reflexes are normal and symmetric.   Psychiatric:         Speech: Speech normal.           Assessment/Plan   #Encephalopathy in the setting of ADHD    Discussed following up in 3 months. Medication was sent to pharmacy. Discussed with the patient the purpose of medication, as well as potential side effects to be aware of. Informed the patient about abuse potential of medication and the importance adhering to the controlled substance agreement. Advised  patient to call office with any adverse reaction to medication.      #Headache  Will continue to monitor

## 2025-06-04 ENCOUNTER — APPOINTMENT (OUTPATIENT)
Dept: NEUROLOGY | Facility: CLINIC | Age: 50
End: 2025-06-04
Payer: COMMERCIAL

## 2025-06-04 VITALS
WEIGHT: 195.6 LBS | SYSTOLIC BLOOD PRESSURE: 124 MMHG | DIASTOLIC BLOOD PRESSURE: 86 MMHG | HEIGHT: 68 IN | BODY MASS INDEX: 29.64 KG/M2 | HEART RATE: 88 BPM

## 2025-06-04 DIAGNOSIS — R51.9 ACUTE NONINTRACTABLE HEADACHE, UNSPECIFIED HEADACHE TYPE: ICD-10-CM

## 2025-06-04 DIAGNOSIS — G93.40 ENCEPHALOPATHY, UNSPECIFIED TYPE: Primary | ICD-10-CM

## 2025-06-04 DIAGNOSIS — F90.2 ATTENTION DEFICIT HYPERACTIVITY DISORDER, COMBINED TYPE: ICD-10-CM

## 2025-06-04 PROCEDURE — 99214 OFFICE O/P EST MOD 30 MIN: CPT

## 2025-06-04 PROCEDURE — 3079F DIAST BP 80-89 MM HG: CPT

## 2025-06-04 PROCEDURE — 1036F TOBACCO NON-USER: CPT

## 2025-06-04 PROCEDURE — 3008F BODY MASS INDEX DOCD: CPT

## 2025-06-04 PROCEDURE — 3074F SYST BP LT 130 MM HG: CPT

## 2025-06-04 RX ORDER — DEXTROAMPHETAMINE SACCHARATE, AMPHETAMINE ASPARTATE, DEXTROAMPHETAMINE SULFATE AND AMPHETAMINE SULFATE 3.75; 3.75; 3.75; 3.75 MG/1; MG/1; MG/1; MG/1
15 TABLET ORAL
Qty: 60 TABLET | Refills: 0 | Status: SHIPPED | OUTPATIENT
Start: 2025-08-03 | End: 2025-09-02

## 2025-06-04 RX ORDER — DEXTROAMPHETAMINE SACCHARATE, AMPHETAMINE ASPARTATE, DEXTROAMPHETAMINE SULFATE AND AMPHETAMINE SULFATE 3.75; 3.75; 3.75; 3.75 MG/1; MG/1; MG/1; MG/1
15 TABLET ORAL
Qty: 60 TABLET | Refills: 0 | Status: SHIPPED | OUTPATIENT
Start: 2025-06-04

## 2025-06-04 RX ORDER — DEXTROAMPHETAMINE SACCHARATE, AMPHETAMINE ASPARTATE, DEXTROAMPHETAMINE SULFATE AND AMPHETAMINE SULFATE 3.75; 3.75; 3.75; 3.75 MG/1; MG/1; MG/1; MG/1
15 TABLET ORAL
Qty: 60 TABLET | Refills: 0 | Status: SHIPPED | OUTPATIENT
Start: 2025-07-03 | End: 2025-08-02

## 2025-06-30 ENCOUNTER — APPOINTMENT (OUTPATIENT)
Dept: RADIOLOGY | Facility: HOSPITAL | Age: 50
End: 2025-06-30
Payer: COMMERCIAL

## 2025-06-30 ENCOUNTER — HOSPITAL ENCOUNTER (EMERGENCY)
Facility: HOSPITAL | Age: 50
Discharge: HOME | End: 2025-06-30
Payer: COMMERCIAL

## 2025-06-30 VITALS
HEART RATE: 90 BPM | WEIGHT: 195 LBS | RESPIRATION RATE: 18 BRPM | DIASTOLIC BLOOD PRESSURE: 90 MMHG | HEIGHT: 67 IN | BODY MASS INDEX: 30.61 KG/M2 | TEMPERATURE: 97.9 F | SYSTOLIC BLOOD PRESSURE: 137 MMHG | OXYGEN SATURATION: 99 %

## 2025-06-30 DIAGNOSIS — R22.42 LOCALIZED SWELLING OF LEFT LOWER EXTREMITY: Primary | ICD-10-CM

## 2025-06-30 PROCEDURE — 99284 EMERGENCY DEPT VISIT MOD MDM: CPT | Mod: 25

## 2025-06-30 PROCEDURE — 93971 EXTREMITY STUDY: CPT | Performed by: RADIOLOGY

## 2025-06-30 PROCEDURE — 93971 EXTREMITY STUDY: CPT

## 2025-06-30 ASSESSMENT — PAIN DESCRIPTION - LOCATION: LOCATION: LEG

## 2025-06-30 ASSESSMENT — LIFESTYLE VARIABLES
EVER HAD A DRINK FIRST THING IN THE MORNING TO STEADY YOUR NERVES TO GET RID OF A HANGOVER: NO
EVER FELT BAD OR GUILTY ABOUT YOUR DRINKING: NO
HAVE PEOPLE ANNOYED YOU BY CRITICIZING YOUR DRINKING: NO
TOTAL SCORE: 0
HAVE YOU EVER FELT YOU SHOULD CUT DOWN ON YOUR DRINKING: NO

## 2025-06-30 ASSESSMENT — PAIN SCALES - GENERAL
PAINLEVEL_OUTOF10: 0 - NO PAIN
PAINLEVEL_OUTOF10: 3

## 2025-06-30 ASSESSMENT — PAIN - FUNCTIONAL ASSESSMENT: PAIN_FUNCTIONAL_ASSESSMENT: 0-10

## 2025-06-30 NOTE — ED PROVIDER NOTES
HPI   Chief Complaint   Patient presents with    Leg Swelling     LLE     50-year-old female presents emergency department today for evaluation of left lower extremity pain patient tells me that she has been working out, doing cupping, and has also been in the tanning bed.  Patient tells me that she believes this is why her legs have been sore however she is concerned as she is having left lower extremity swelling as well.  Patient denies any specific injury or trauma.  Patient denies history of blood clots.  Patient tells me that she did reach out to her PCP today who recommended that she come to the emergency department today for evaluation to rule out a DVT.  Patient tells me she is also concerned about a dry patch of skin noted to her lower back.  She tells me since she has been in the tanning bed she wants to make sure that she does not have any type of infection.  Patient denies any fever or chills.  Patient denies taking any medications prior to arrival.  Patient has no other complaints today.      History provided by:  Patient   used: No            Patient History   Medical History[1]  Surgical History[2]  Family History[3]  Social History[4]    Physical Exam   ED Triage Vitals [06/30/25 1659]   Temperature Heart Rate Respirations BP   36.6 °C (97.9 °F) 87 18 157/86      Pulse Ox Temp Source Heart Rate Source Patient Position   99 % Temporal -- Sitting      BP Location FiO2 (%)     Right arm --       Physical Exam  Vitals and nursing note reviewed.   Constitutional:       Appearance: Normal appearance.   HENT:      Head: Normocephalic and atraumatic.   Cardiovascular:      Rate and Rhythm: Normal rate and regular rhythm.      Pulses: Normal pulses.      Heart sounds: Normal heart sounds.   Pulmonary:      Effort: Pulmonary effort is normal.   Musculoskeletal:      Comments: Unilateral swelling to left ankle, left calf circumference is greater than right.  Patient has strong pedal pulses  bilaterally.   Skin:     General: Skin is warm and dry.      Capillary Refill: Capillary refill takes less than 2 seconds.   Neurological:      General: No focal deficit present.      Mental Status: She is alert and oriented to person, place, and time.   Psychiatric:         Mood and Affect: Mood normal.         Behavior: Behavior normal.           ED Course & MDM   Diagnoses as of 06/30/25 1911   Localized swelling of left lower extremity                 No data recorded                                 Medical Decision Making    Patient seen and examined emergency department; patient is healthy nontoxic in appearance and does not appear to be in acute distress.  Patient is neurologically intact without any focal deficits.  Patient's respirations are even unlabored, skin is warm and dry no diaphoresis noted.  Patient does have swelling noted to the left ankle and left calf circumference is slightly greater than right.  Given the signs and symptoms we will order ultrasound of left lower extremity to rule out DVT.  Patient agreed with medical plan assessment.    Was negative for DVT.  Patient updated on results.  Patient I discussed elevating her leg to aid in relief of swelling.  We also discussed increasing the amount of water she drinks as she has been working out more frequently.  All patient's questions and concerns were addressed prior to discharge.  Patient discharged home in stable condition.    Labs Reviewed - No data to display    Lower extremity venous duplex left   Final Result   No deep venous thrombosis of the  left lower extremity.        MACRO:   None        Signed by: Luciano Lau 6/30/2025 7:00 PM   Dictation workstation:   [id number]                Labs Reviewed - No data to display    Lower extremity venous duplex left   Final Result   No deep venous thrombosis of the  left lower extremity.        MACRO:   None        Signed by: Luciano Lau 6/30/2025 7:00 PM   Dictation workstation:    [id number]          Procedure  Procedures       [1] No past medical history on file.  [2]   Past Surgical History:  Procedure Laterality Date    OTHER SURGICAL HISTORY  11/26/2018    Gallbladder surgery    TONSILLECTOMY      TUBAL LIGATION     [3] No family history on file.  [4]   Social History  Tobacco Use    Smoking status: Never    Smokeless tobacco: Never   Vaping Use    Vaping status: Never Used   Substance Use Topics    Alcohol use: Not Currently    Drug use: Never        Char Irving, SARMAD-CNP  06/30/25 1929

## 2025-08-25 ENCOUNTER — APPOINTMENT (OUTPATIENT)
Dept: NEUROLOGY | Facility: CLINIC | Age: 50
End: 2025-08-25
Payer: COMMERCIAL

## 2025-08-25 VITALS
BODY MASS INDEX: 30.92 KG/M2 | SYSTOLIC BLOOD PRESSURE: 131 MMHG | HEIGHT: 67 IN | WEIGHT: 197 LBS | HEART RATE: 96 BPM | DIASTOLIC BLOOD PRESSURE: 86 MMHG

## 2025-08-25 DIAGNOSIS — Z71.51 DRUG ABUSE COUNSELING AND SURVEILLANCE OF DRUG ABUSER: ICD-10-CM

## 2025-08-25 DIAGNOSIS — F90.2 ATTENTION DEFICIT HYPERACTIVITY DISORDER, COMBINED TYPE: ICD-10-CM

## 2025-08-25 DIAGNOSIS — G93.40 ENCEPHALOPATHY, UNSPECIFIED TYPE: Primary | ICD-10-CM

## 2025-08-25 PROCEDURE — 3008F BODY MASS INDEX DOCD: CPT

## 2025-08-25 PROCEDURE — 1036F TOBACCO NON-USER: CPT

## 2025-08-25 PROCEDURE — 3075F SYST BP GE 130 - 139MM HG: CPT

## 2025-08-25 PROCEDURE — 99214 OFFICE O/P EST MOD 30 MIN: CPT

## 2025-08-25 PROCEDURE — 3079F DIAST BP 80-89 MM HG: CPT

## 2025-08-25 RX ORDER — DEXTROAMPHETAMINE SACCHARATE, AMPHETAMINE ASPARTATE, DEXTROAMPHETAMINE SULFATE AND AMPHETAMINE SULFATE 3.75; 3.75; 3.75; 3.75 MG/1; MG/1; MG/1; MG/1
15 TABLET ORAL
Qty: 60 TABLET | Refills: 0 | Status: SHIPPED | OUTPATIENT
Start: 2025-09-24 | End: 2025-10-24

## 2025-08-25 RX ORDER — DEXTROAMPHETAMINE SACCHARATE, AMPHETAMINE ASPARTATE, DEXTROAMPHETAMINE SULFATE AND AMPHETAMINE SULFATE 3.75; 3.75; 3.75; 3.75 MG/1; MG/1; MG/1; MG/1
15 TABLET ORAL
Qty: 60 TABLET | Refills: 0 | Status: SHIPPED | OUTPATIENT
Start: 2025-10-24

## 2025-08-25 RX ORDER — DEXTROAMPHETAMINE SACCHARATE, AMPHETAMINE ASPARTATE, DEXTROAMPHETAMINE SULFATE AND AMPHETAMINE SULFATE 3.75; 3.75; 3.75; 3.75 MG/1; MG/1; MG/1; MG/1
15 TABLET ORAL
Qty: 60 TABLET | Refills: 0 | Status: SHIPPED | OUTPATIENT
Start: 2025-08-25 | End: 2025-09-24

## 2025-08-26 LAB
AMPHET UR-MCNC: NORMAL NG/ML
AMPHETAMINES UR QL: NORMAL NG/ML
BARBITURATES UR QL: NEGATIVE NG/ML
BENZODIAZ UR QL: NEGATIVE NG/ML
BZE UR QL: NEGATIVE NG/ML
CREAT UR-MCNC: 197.3 MG/DL
DRUG SCREEN COMMENT UR-IMP: NORMAL
FENTANYL UR QL SCN: NEGATIVE NG/ML
METHADONE UR QL: NEGATIVE NG/ML
METHAMPHET UR-MCNC: NORMAL NG/ML
OPIATES UR QL: NEGATIVE NG/ML
OXIDANTS UR QL: NEGATIVE MCG/ML
OXYCODONE UR QL: NEGATIVE NG/ML
PCP UR QL: NEGATIVE NG/ML
PH UR: 7 [PH] (ref 4.5–9)
QUEST NOTES AND COMMENTS: NORMAL
QUEST PATIENT HISTORICAL REPORT: NORMAL
THC UR QL: NEGATIVE NG/ML

## 2025-09-02 LAB
AMPHET UR-MCNC: ABNORMAL NG/ML
AMPHETAMINES UR QL: POSITIVE NG/ML
BARBITURATES UR QL: NEGATIVE NG/ML
BENZODIAZ UR QL: NEGATIVE NG/ML
BZE UR QL: NEGATIVE NG/ML
CREAT UR-MCNC: 197.3 MG/DL
DRUG SCREEN COMMENT UR-IMP: ABNORMAL
FENTANYL UR QL SCN: NEGATIVE NG/ML
METHADONE UR QL: NEGATIVE NG/ML
METHAMPHET UR-MCNC: NEGATIVE NG/ML
OPIATES UR QL: NEGATIVE NG/ML
OXIDANTS UR QL: NEGATIVE MCG/ML
OXYCODONE UR QL: NEGATIVE NG/ML
PCP UR QL: NEGATIVE NG/ML
PH UR: 7 [PH] (ref 4.5–9)
QUEST NOTES AND COMMENTS: ABNORMAL
THC UR QL: NEGATIVE NG/ML

## 2025-09-10 ENCOUNTER — APPOINTMENT (OUTPATIENT)
Dept: OBSTETRICS AND GYNECOLOGY | Facility: CLINIC | Age: 50
End: 2025-09-10
Payer: COMMERCIAL

## 2025-11-19 ENCOUNTER — APPOINTMENT (OUTPATIENT)
Dept: NEUROLOGY | Facility: CLINIC | Age: 50
End: 2025-11-19
Payer: COMMERCIAL